# Patient Record
Sex: FEMALE | Race: WHITE | NOT HISPANIC OR LATINO | Employment: OTHER | URBAN - METROPOLITAN AREA
[De-identification: names, ages, dates, MRNs, and addresses within clinical notes are randomized per-mention and may not be internally consistent; named-entity substitution may affect disease eponyms.]

---

## 2023-05-03 LAB
ALBUMIN (G/DL) IN SER/PLAS: 4 G/DL (ref 3.4–5)
ANION GAP IN SER/PLAS: 11 MMOL/L (ref 10–20)
APPEARANCE, URINE: NORMAL
BILIRUBIN, URINE: NEGATIVE
BLOOD, URINE: NEGATIVE
CALCIUM (MG/DL) IN SER/PLAS: 9.5 MG/DL (ref 8.6–10.3)
CARBON DIOXIDE, TOTAL (MMOL/L) IN SER/PLAS: 31 MMOL/L (ref 21–32)
CHLORIDE (MMOL/L) IN SER/PLAS: 101 MMOL/L (ref 98–107)
COLOR, URINE: YELLOW
CREATININE (MG/DL) IN SER/PLAS: 1.16 MG/DL (ref 0.5–1.05)
GFR FEMALE: 49 ML/MIN/1.73M2
GLUCOSE (MG/DL) IN SER/PLAS: 80 MG/DL (ref 74–99)
GLUCOSE, URINE: NEGATIVE MG/DL
KETONES, URINE: NEGATIVE MG/DL
LEUKOCYTE ESTERASE, URINE: NEGATIVE
NITRITE, URINE: NEGATIVE
PH, URINE: 5 (ref 5–8)
PHOSPHATE (MG/DL) IN SER/PLAS: 3.4 MG/DL (ref 2.5–4.9)
POTASSIUM (MMOL/L) IN SER/PLAS: 4.2 MMOL/L (ref 3.5–5.3)
PROTEIN, URINE: NEGATIVE MG/DL
SODIUM (MMOL/L) IN SER/PLAS: 139 MMOL/L (ref 136–145)
SPECIFIC GRAVITY, URINE: 1.02 (ref 1–1.03)
UREA NITROGEN (MG/DL) IN SER/PLAS: 15 MG/DL (ref 6–23)
UROBILINOGEN, URINE: <2 MG/DL (ref 0–1.9)

## 2023-05-04 LAB
ALBUMIN (MG/L) IN URINE: 69.2 MG/L
ALBUMIN/CREATININE (UG/MG) IN URINE: 32.6 UG/MG CRT (ref 0–30)
CREATININE (MG/DL) IN URINE: 212 MG/DL (ref 20–320)

## 2023-06-09 LAB
CHOLESTEROL (MG/DL) IN SER/PLAS: 242 MG/DL (ref 0–199)
CHOLESTEROL IN HDL (MG/DL) IN SER/PLAS: 63.5 MG/DL
CHOLESTEROL/HDL RATIO: 3.8
LDL: 157 MG/DL (ref 0–99)
TRIGLYCERIDE (MG/DL) IN SER/PLAS: 110 MG/DL (ref 0–149)
VLDL: 22 MG/DL (ref 0–40)

## 2023-09-01 LAB
ALBUMIN (G/DL) IN SER/PLAS: 4.4 G/DL (ref 3.4–5)
ALBUMIN (MG/L) IN URINE: 511.7 MG/L
ALBUMIN/CREATININE (UG/MG) IN URINE: 336.6 UG/MG CRT (ref 0–30)
ANION GAP IN SER/PLAS: 12 MMOL/L (ref 10–20)
CALCIDIOL (25 OH VITAMIN D3) (NG/ML) IN SER/PLAS: 57 NG/ML
CALCIUM (MG/DL) IN SER/PLAS: 10.3 MG/DL (ref 8.6–10.6)
CARBON DIOXIDE, TOTAL (MMOL/L) IN SER/PLAS: 30 MMOL/L (ref 21–32)
CHLORIDE (MMOL/L) IN SER/PLAS: 103 MMOL/L (ref 98–107)
CREATININE (MG/DL) IN SER/PLAS: 1.15 MG/DL (ref 0.5–1.05)
CREATININE (MG/DL) IN URINE: 152 MG/DL (ref 20–320)
GFR FEMALE: 50 ML/MIN/1.73M2
GLUCOSE (MG/DL) IN SER/PLAS: 86 MG/DL (ref 74–99)
PHOSPHATE (MG/DL) IN SER/PLAS: 4.3 MG/DL (ref 2.5–4.9)
POTASSIUM (MMOL/L) IN SER/PLAS: 4.6 MMOL/L (ref 3.5–5.3)
SODIUM (MMOL/L) IN SER/PLAS: 140 MMOL/L (ref 136–145)
UREA NITROGEN (MG/DL) IN SER/PLAS: 13 MG/DL (ref 6–23)

## 2023-09-02 LAB
APPEARANCE, URINE: NORMAL
ASCORBIC ACID: NORMAL MG/DL
BILIRUBIN, URINE: NORMAL
BLOOD, URINE: NORMAL
COLOR, URINE: NORMAL
GLUCOSE, URINE: NORMAL
KETONES, URINE: NORMAL
LEUKOCYTE ESTERASE, URINE: NORMAL
NITRITE, URINE: NORMAL
PH, URINE: NORMAL
PROTEIN, URINE: NORMAL
SPECIFIC GRAVITY, URINE: NORMAL
UROBILINOGEN, URINE: NORMAL

## 2023-09-11 LAB
ALBUMIN (MG/L) IN URINE: 17.8 MG/L
ALBUMIN/CREATININE (UG/MG) IN URINE: 18.6 UG/MG CRT (ref 0–30)
CREATININE (MG/DL) IN URINE: 95.7 MG/DL (ref 20–320)
CREATININE (MG/DL) IN URINE: 95.7 MG/DL (ref 20–320)
PROTEIN (MG/DL) IN URINE: 12 MG/DL (ref 5–24)
PROTEIN/CREATININE (MG/MG) IN URINE: 0.13 MG/MG CREAT (ref 0–0.17)

## 2023-09-18 LAB
ALBUMIN (G/DL) IN SER/PLAS: 4.3 G/DL (ref 3.4–5)
ANION GAP IN SER/PLAS: 9 MMOL/L (ref 10–20)
CALCIUM (MG/DL) IN SER/PLAS: 9.8 MG/DL (ref 8.6–10.6)
CARBON DIOXIDE, TOTAL (MMOL/L) IN SER/PLAS: 31 MMOL/L (ref 21–32)
CHLORIDE (MMOL/L) IN SER/PLAS: 103 MMOL/L (ref 98–107)
CREATININE (MG/DL) IN SER/PLAS: 1.14 MG/DL (ref 0.5–1.05)
GFR FEMALE: 50 ML/MIN/1.73M2
GLUCOSE (MG/DL) IN SER/PLAS: 72 MG/DL (ref 74–99)
PHOSPHATE (MG/DL) IN SER/PLAS: 4 MG/DL (ref 2.5–4.9)
POTASSIUM (MMOL/L) IN SER/PLAS: 4.8 MMOL/L (ref 3.5–5.3)
SODIUM (MMOL/L) IN SER/PLAS: 138 MMOL/L (ref 136–145)
UREA NITROGEN (MG/DL) IN SER/PLAS: 14 MG/DL (ref 6–23)

## 2023-10-05 DIAGNOSIS — F33.9 MAJOR DEPRESSIVE DISORDER, RECURRENT EPISODE, UNSPECIFIED (CMS-HCC): Primary | ICD-10-CM

## 2023-10-05 RX ORDER — ESCITALOPRAM OXALATE 10 MG/1
10 TABLET ORAL DAILY
COMMUNITY
Start: 2022-08-15 | End: 2023-10-05 | Stop reason: SDUPTHER

## 2023-10-06 RX ORDER — ESCITALOPRAM OXALATE 10 MG/1
10 TABLET ORAL DAILY
Qty: 90 TABLET | Refills: 2 | Status: SHIPPED | OUTPATIENT
Start: 2023-10-06 | End: 2024-05-15

## 2023-11-29 ENCOUNTER — LAB (OUTPATIENT)
Dept: LAB | Facility: LAB | Age: 73
End: 2023-11-29
Payer: MEDICARE

## 2023-11-29 DIAGNOSIS — I10 ESSENTIAL (PRIMARY) HYPERTENSION: ICD-10-CM

## 2023-11-29 DIAGNOSIS — E55.9 VITAMIN D DEFICIENCY, UNSPECIFIED: ICD-10-CM

## 2023-11-29 DIAGNOSIS — N18.30 CHRONIC KIDNEY DISEASE, STAGE 3 UNSPECIFIED (MULTI): Primary | ICD-10-CM

## 2023-11-29 LAB
25(OH)D3 SERPL-MCNC: 55 NG/ML (ref 30–100)
ALBUMIN SERPL BCP-MCNC: 4.2 G/DL (ref 3.4–5)
ANION GAP SERPL CALC-SCNC: 14 MMOL/L (ref 10–20)
BUN SERPL-MCNC: 17 MG/DL (ref 6–23)
CALCIUM SERPL-MCNC: 9.6 MG/DL (ref 8.6–10.6)
CHLORIDE SERPL-SCNC: 105 MMOL/L (ref 98–107)
CHOLEST SERPL-MCNC: 247 MG/DL (ref 0–199)
CHOLESTEROL/HDL RATIO: 4.2
CO2 SERPL-SCNC: 27 MMOL/L (ref 21–32)
CREAT SERPL-MCNC: 1.24 MG/DL (ref 0.5–1.05)
CREAT UR-MCNC: 91.8 MG/DL (ref 20–320)
ERYTHROCYTE [DISTWIDTH] IN BLOOD BY AUTOMATED COUNT: 12.6 % (ref 11.5–14.5)
GFR SERPL CREATININE-BSD FRML MDRD: 45 ML/MIN/1.73M*2
GLUCOSE SERPL-MCNC: 90 MG/DL (ref 74–99)
HCT VFR BLD AUTO: 41.7 % (ref 36–46)
HDLC SERPL-MCNC: 59.1 MG/DL
HGB BLD-MCNC: 13 G/DL (ref 12–16)
LDLC SERPL CALC-MCNC: 166 MG/DL
MCH RBC QN AUTO: 30 PG (ref 26–34)
MCHC RBC AUTO-ENTMCNC: 31.2 G/DL (ref 32–36)
MCV RBC AUTO: 96 FL (ref 80–100)
MICROALBUMIN UR-MCNC: <7 MG/L
MICROALBUMIN/CREAT UR: NORMAL MG/G{CREAT}
MUCOUS THREADS #/AREA URNS AUTO: NORMAL /LPF
NON HDL CHOLESTEROL: 188 MG/DL (ref 0–149)
NRBC BLD-RTO: 0 /100 WBCS (ref 0–0)
PHOSPHATE SERPL-MCNC: 3.9 MG/DL (ref 2.5–4.9)
PLATELET # BLD AUTO: 300 X10*3/UL (ref 150–450)
POTASSIUM SERPL-SCNC: 4.5 MMOL/L (ref 3.5–5.3)
PTH-INTACT SERPL-MCNC: 48.4 PG/ML (ref 18.5–88)
RBC # BLD AUTO: 4.33 X10*6/UL (ref 4–5.2)
RBC #/AREA URNS AUTO: NORMAL /HPF
SODIUM SERPL-SCNC: 141 MMOL/L (ref 136–145)
TRIGL SERPL-MCNC: 112 MG/DL (ref 0–149)
VLDL: 22 MG/DL (ref 0–40)
WBC # BLD AUTO: 6.1 X10*3/UL (ref 4.4–11.3)
WBC #/AREA URNS AUTO: NORMAL /HPF

## 2023-11-29 PROCEDURE — 80069 RENAL FUNCTION PANEL: CPT

## 2023-11-29 PROCEDURE — 82306 VITAMIN D 25 HYDROXY: CPT

## 2023-11-29 PROCEDURE — 82043 UR ALBUMIN QUANTITATIVE: CPT

## 2023-11-29 PROCEDURE — 80061 LIPID PANEL: CPT

## 2023-11-29 PROCEDURE — 83970 ASSAY OF PARATHORMONE: CPT

## 2023-11-29 PROCEDURE — 85027 COMPLETE CBC AUTOMATED: CPT

## 2023-11-29 PROCEDURE — 82570 ASSAY OF URINE CREATININE: CPT

## 2023-11-29 PROCEDURE — 81001 URINALYSIS AUTO W/SCOPE: CPT

## 2023-11-29 PROCEDURE — 36415 COLL VENOUS BLD VENIPUNCTURE: CPT

## 2023-12-07 DIAGNOSIS — I10 ESSENTIAL (PRIMARY) HYPERTENSION: ICD-10-CM

## 2023-12-11 ENCOUNTER — TELEPHONE (OUTPATIENT)
Dept: PRIMARY CARE | Facility: CLINIC | Age: 73
End: 2023-12-11
Payer: MEDICARE

## 2023-12-11 RX ORDER — LOSARTAN POTASSIUM 25 MG/1
25 TABLET ORAL DAILY
Qty: 90 TABLET | Refills: 0 | Status: SHIPPED | OUTPATIENT
Start: 2023-12-11 | End: 2024-05-01 | Stop reason: ALTCHOICE

## 2023-12-15 ENCOUNTER — ANCILLARY PROCEDURE (OUTPATIENT)
Dept: RADIOLOGY | Facility: CLINIC | Age: 73
End: 2023-12-15
Payer: MEDICARE

## 2023-12-15 DIAGNOSIS — Z78.0 ASYMPTOMATIC MENOPAUSAL STATE: ICD-10-CM

## 2023-12-15 PROCEDURE — 77080 DXA BONE DENSITY AXIAL: CPT | Performed by: RADIOLOGY

## 2023-12-15 PROCEDURE — 77080 DXA BONE DENSITY AXIAL: CPT

## 2023-12-19 DIAGNOSIS — M81.0 AGE-RELATED OSTEOPOROSIS WITHOUT CURRENT PATHOLOGICAL FRACTURE: Primary | ICD-10-CM

## 2023-12-19 RX ORDER — ALENDRONATE SODIUM 70 MG/1
70 TABLET ORAL
Qty: 4 TABLET | Refills: 11 | Status: SHIPPED | OUTPATIENT
Start: 2023-12-19 | End: 2024-12-18

## 2024-02-13 ENCOUNTER — OFFICE VISIT (OUTPATIENT)
Dept: PRIMARY CARE | Facility: CLINIC | Age: 74
End: 2024-02-13
Payer: MEDICARE

## 2024-02-13 VITALS
BODY MASS INDEX: 23.6 KG/M2 | DIASTOLIC BLOOD PRESSURE: 78 MMHG | OXYGEN SATURATION: 96 % | SYSTOLIC BLOOD PRESSURE: 122 MMHG | WEIGHT: 125 LBS | HEIGHT: 61 IN | HEART RATE: 77 BPM | RESPIRATION RATE: 16 BRPM

## 2024-02-13 DIAGNOSIS — N18.2 STAGE 2 CHRONIC KIDNEY DISEASE: ICD-10-CM

## 2024-02-13 DIAGNOSIS — F41.8 DEPRESSION WITH ANXIETY: Primary | ICD-10-CM

## 2024-02-13 DIAGNOSIS — I10 ESSENTIAL HYPERTENSION: ICD-10-CM

## 2024-02-13 PROCEDURE — 1170F FXNL STATUS ASSESSED: CPT | Performed by: INTERNAL MEDICINE

## 2024-02-13 PROCEDURE — 1159F MED LIST DOCD IN RCRD: CPT | Performed by: INTERNAL MEDICINE

## 2024-02-13 PROCEDURE — 99214 OFFICE O/P EST MOD 30 MIN: CPT | Performed by: INTERNAL MEDICINE

## 2024-02-13 PROCEDURE — 1126F AMNT PAIN NOTED NONE PRSNT: CPT | Performed by: INTERNAL MEDICINE

## 2024-02-13 PROCEDURE — 1036F TOBACCO NON-USER: CPT | Performed by: INTERNAL MEDICINE

## 2024-02-13 PROCEDURE — 3078F DIAST BP <80 MM HG: CPT | Performed by: INTERNAL MEDICINE

## 2024-02-13 PROCEDURE — 3074F SYST BP LT 130 MM HG: CPT | Performed by: INTERNAL MEDICINE

## 2024-02-13 ASSESSMENT — ACTIVITIES OF DAILY LIVING (ADL)
TOILETING: INDEPENDENT
GROOMING: INDEPENDENT
PATIENT'S MEMORY ADEQUATE TO SAFELY COMPLETE DAILY ACTIVITIES?: YES
JUDGMENT_ADEQUATE_SAFELY_COMPLETE_DAILY_ACTIVITIES: YES
BATHING: INDEPENDENT
FEEDING YOURSELF: INDEPENDENT
ADEQUATE_TO_COMPLETE_ADL: YES
HEARING - RIGHT EAR: FUNCTIONAL
DRESSING YOURSELF: INDEPENDENT
WALKS IN HOME: INDEPENDENT

## 2024-02-13 ASSESSMENT — ENCOUNTER SYMPTOMS
DEPRESSION: 0
OCCASIONAL FEELINGS OF UNSTEADINESS: 0
LOSS OF SENSATION IN FEET: 0

## 2024-02-13 ASSESSMENT — PATIENT HEALTH QUESTIONNAIRE - PHQ9
2. FEELING DOWN, DEPRESSED OR HOPELESS: NOT AT ALL
SUM OF ALL RESPONSES TO PHQ9 QUESTIONS 1 AND 2: 0
1. LITTLE INTEREST OR PLEASURE IN DOING THINGS: NOT AT ALL

## 2024-02-13 NOTE — PATIENT INSTRUCTIONS
It was a pleasure to see you Ms. Olmstead! You are doing well, and your blood pressure is well controlled. You are up to date on your mammogram, and now that you are older, we no longer recommend a colonoscopy for screening. Please make sure you get your updated Shingrix vaccine at your local pharmacy. Have a great time in [place] and the [facility], and make sure you get any necessary vaccines with travel clinic. With regards to good weight bearing exercises, we recommend some push ups to help you.

## 2024-02-13 NOTE — PROGRESS NOTES
"Subjective      HPI    Patient Care Team:  Saravanan Logan MD as PCP - General  Saravanan Logan MD as PCP - [facility] Attributed Provider     Maribel Olmstead is an 73 y.o. female w/ HTN, CKD, depression, here for follow up, last seen in September during wellness visit. She is going to [place] and the [facility] and has a travel clinic appointment set up. She would like to learn some weight bearing exercises. Otherwise, no concerns today.     HTN: On Amlodipine, was switched to Losartan last visit, but switched back to amlodipine as Losartan was making her dizzy. Checks blood pressures at home, runs 120s-130s systolics.     CKD: Last seen by Dr. Juarez in 11/23, follows a renal diet     Depression: On Wellbutrin and Lexapro, mood is \"fine.\" Has an SAD lamp. No therapy. No SI or HI.     Health Maintenance: Last mammogram 8/2023, DEXA scan done in 12/23 with osteopenia, currently on vitamin d/Ca supplementation. UTD on COVID, flu, RSV,  needs updated Shingrix vaccine.   No fevers no chills, no weight changes.    No URI symptoms    No cough no shortness of breath    No chest pain no palpitations    Appetite intact, no abdominal pain.    No new or unusual headaches.    Vitals and exam:Blood pressure 122/78, pulse 77, resp. rate 16, height 1.549 m (5' 1\"), weight 56.7 kg (125 lb), SpO2 96 %.  Calm coherent and appropriate    Neck is supple and none tender    Breathing comfortably, clear to auscultation bilaterally    Regular rate rhythm, no murmur gallops or rubs.  Good distal pulses.  No edema.  No JVD.    Abdomen soft and nontender, normal bowel sounds.    Extremities warm well perfused with no clubbing or cyanosis    Cognition intact.          Assessment/Plan   Problem List Items Addressed This Visit    None    Pleasant 74 yo female w/ HTN, CKD, depression, here for follow up. She is doing well, no abnormalities on physical exam. UTD on screenings, BP at goal today. Encouraged to get Shingrix at local pharmacy and reviewed " weight bearing exercises.     #HTN  - At goal   - Continue with Amlodipine and Losartan    #CKD  - Last Cr 1.24  - Following with renal, last seen in 11/2023    #Depression  - Stable, continue with Lexapro and Wellbutrin     #Health maintenance  - Now that she is 73, will hold off on further colonoscopies  - Last mammogram 8/23  - Last DEXA scan 12/23  - Reviewed weight bearing exercises  - Pt to go to travel clinic prior to trip to [place] and [place]  [ ]  Follow up in six months for annual medicare wellness visit    Pt seen and discussed with Dr. Logan.    Norma Wilde  Med Peds PGY3       Attending note:    I reviewed resident's note including history, exam, assessment and plan.  I personally obtained key parts of history and examined the patient,  I agree with resident's assessment and plan as stated above.

## 2024-02-27 ENCOUNTER — CLINICAL SUPPORT (OUTPATIENT)
Dept: INFECTIOUS DISEASES | Facility: CLINIC | Age: 74
End: 2024-02-27
Payer: MEDICARE

## 2024-02-27 DIAGNOSIS — Z23 ENCOUNTER FOR VACCINATION: Primary | ICD-10-CM

## 2024-02-27 DIAGNOSIS — Z71.84 COUNSELING FOR TRAVEL: ICD-10-CM

## 2024-02-27 PROCEDURE — 99202U03 TRAVEL CONSULT (U03): Performed by: INTERNAL MEDICINE

## 2024-02-27 PROCEDURE — 90690 TYPHOID VACCINE ORAL: CPT | Performed by: INTERNAL MEDICINE

## 2024-02-27 RX ORDER — TRAZODONE HYDROCHLORIDE 50 MG/1
50 TABLET ORAL NIGHTLY
COMMUNITY
Start: 2022-08-15 | End: 2024-05-01 | Stop reason: DRUGHIGH

## 2024-02-27 RX ORDER — AMLODIPINE BESYLATE 2.5 MG/1
2.5 TABLET ORAL
COMMUNITY
Start: 2022-08-22 | End: 2024-05-01 | Stop reason: ALTCHOICE

## 2024-02-27 RX ORDER — NIRMATRELVIR AND RITONAVIR 150-100 MG
2 KIT ORAL 2 TIMES DAILY
Qty: 20 TABLET | Refills: 0 | Status: SHIPPED | OUTPATIENT
Start: 2024-02-27 | End: 2024-03-03

## 2024-02-27 RX ORDER — SCOLOPAMINE TRANSDERMAL SYSTEM 1 MG/1
1 PATCH, EXTENDED RELEASE TRANSDERMAL
Qty: 3 PATCH | Refills: 0 | Status: SHIPPED | OUTPATIENT
Start: 2024-02-27 | End: 2024-03-07

## 2024-02-27 RX ORDER — BUPROPION HYDROCHLORIDE 300 MG/1
300 TABLET ORAL EVERY MORNING
COMMUNITY
End: 2024-04-24

## 2024-02-27 RX ORDER — CIPROFLOXACIN 500 MG/1
500 TABLET ORAL 2 TIMES DAILY
Qty: 6 TABLET | Refills: 0 | Status: SHIPPED | OUTPATIENT
Start: 2024-02-27 | End: 2024-03-01

## 2024-02-27 RX ORDER — ACETAZOLAMIDE 250 MG/1
250 TABLET ORAL 2 TIMES DAILY
Qty: 20 TABLET | Refills: 0 | Status: SHIPPED | OUTPATIENT
Start: 2024-02-27 | End: 2024-05-01 | Stop reason: ALTCHOICE

## 2024-02-27 RX ORDER — ATOVAQUONE AND PROGUANIL HYDROCHLORIDE 250; 100 MG/1; MG/1
1 TABLET, FILM COATED ORAL DAILY
Qty: 12 TABLET | Refills: 0 | Status: SHIPPED | OUTPATIENT
Start: 2024-02-27 | End: 2024-03-10

## 2024-02-27 NOTE — PROGRESS NOTES
Going to [place] and [place] for 22 days     PLAN  --YF (gave card, were immunized  years ago, accepts any liabilities)  --Oral typhoid  --Malarone (renal dose)  --Cipro prn  --Scopolamine patch  --Acetazolamide  --Paxlovid prn (renal dose)  --Discussed other standard travel precautions

## 2024-03-13 ENCOUNTER — TELEPHONE (OUTPATIENT)
Dept: PRIMARY CARE | Facility: CLINIC | Age: 74
End: 2024-03-13

## 2024-03-13 RX ORDER — TRAZODONE HYDROCHLORIDE 50 MG/1
50 TABLET ORAL NIGHTLY
Qty: 30 TABLET | Refills: 0 | Status: CANCELLED | OUTPATIENT
Start: 2024-03-13

## 2024-04-23 DIAGNOSIS — F41.8 DEPRESSION WITH ANXIETY: ICD-10-CM

## 2024-04-24 RX ORDER — BUPROPION HYDROCHLORIDE 300 MG/1
TABLET ORAL
Qty: 90 TABLET | Refills: 0 | Status: SHIPPED | OUTPATIENT
Start: 2024-04-24

## 2024-05-01 ENCOUNTER — OFFICE VISIT (OUTPATIENT)
Dept: PRIMARY CARE | Facility: CLINIC | Age: 74
End: 2024-05-01
Payer: MEDICARE

## 2024-05-01 VITALS
SYSTOLIC BLOOD PRESSURE: 160 MMHG | OXYGEN SATURATION: 98 % | DIASTOLIC BLOOD PRESSURE: 80 MMHG | WEIGHT: 128 LBS | HEART RATE: 67 BPM | BODY MASS INDEX: 24.19 KG/M2

## 2024-05-01 DIAGNOSIS — L98.9 SKIN LESION: Primary | ICD-10-CM

## 2024-05-01 DIAGNOSIS — I10 PRIMARY HYPERTENSION: ICD-10-CM

## 2024-05-01 DIAGNOSIS — E78.5 DYSLIPIDEMIA: ICD-10-CM

## 2024-05-01 DIAGNOSIS — Z12.31 ENCOUNTER FOR SCREENING MAMMOGRAM FOR MALIGNANT NEOPLASM OF BREAST: ICD-10-CM

## 2024-05-01 DIAGNOSIS — N18.31 STAGE 3A CHRONIC KIDNEY DISEASE (MULTI): ICD-10-CM

## 2024-05-01 PROCEDURE — 3079F DIAST BP 80-89 MM HG: CPT | Performed by: STUDENT IN AN ORGANIZED HEALTH CARE EDUCATION/TRAINING PROGRAM

## 2024-05-01 PROCEDURE — 3077F SYST BP >= 140 MM HG: CPT | Performed by: STUDENT IN AN ORGANIZED HEALTH CARE EDUCATION/TRAINING PROGRAM

## 2024-05-01 PROCEDURE — G2211 COMPLEX E/M VISIT ADD ON: HCPCS | Performed by: STUDENT IN AN ORGANIZED HEALTH CARE EDUCATION/TRAINING PROGRAM

## 2024-05-01 PROCEDURE — 99204 OFFICE O/P NEW MOD 45 MIN: CPT | Performed by: STUDENT IN AN ORGANIZED HEALTH CARE EDUCATION/TRAINING PROGRAM

## 2024-05-01 PROCEDURE — 1159F MED LIST DOCD IN RCRD: CPT | Performed by: STUDENT IN AN ORGANIZED HEALTH CARE EDUCATION/TRAINING PROGRAM

## 2024-05-01 RX ORDER — TRAZODONE HYDROCHLORIDE 50 MG/1
25 TABLET ORAL NIGHTLY
Qty: 45 TABLET | Refills: 1 | Status: SHIPPED | OUTPATIENT
Start: 2024-05-01

## 2024-05-01 RX ORDER — AMLODIPINE BESYLATE 5 MG/1
5 TABLET ORAL DAILY
Qty: 90 TABLET | Refills: 1 | Status: SHIPPED | OUTPATIENT
Start: 2024-05-01 | End: 2024-10-28

## 2024-05-01 NOTE — PROGRESS NOTES
Maribel Olmstead is a 74 y.o. female seen in Clinic at [facility] by Dr. Donn Beard on 05/01/24 for routine care, as well as for management of the following chronic medical conditions: Osteoporosis (on Fosamax), HTN, CKD III, DLD (not currently on statin), Depression/Anxiety (well controlled), Insomnia. Patient presents today to establish care.     #Osteoporosis  - last DEXA 12/2023   - started on Fosamax 12/2023  [  ] repeat DEXA due 12/2025  - normal Vitamin D per labs 11/2023     #HTN  - elevated in office today initially and on manual repeat  - Amlodipine 2.5mg daily   [  ] increase to 5mg daily   [  ] home BP monitoring  [  ] labs today   [  ] close follow up in 6 weeks     #Depression/Anxiety   #Insomnia   - Wellbutrin 300mg daily, Lexapro 10mg daily, Trazodone 25mg at bedtime   - well controlled at present     #CKD III  - history of kidney donation remotely   - concurrent HTN as above  - renal function with Cr around 1.2 since at least 2019  - renal US reassuring 2022  - previously seen by nephrology, Dr. Juarez  [  ] updated labs today including Cystatin C   [  ] BP monitoring and med adjustments as above  - prior ARB (Losartan) intolerance per patient   - reassuring PTH, Urine Albumin 11/2023    #DLD   - worsening over last few years  - strong family history of CVA  [  ] updated labs  [  ] CAC scoring   [  ] patient would consider statin    Past Medical History: as above   No past medical history on file.  Subspecialty Medical Care: Nephrology (Larry)    Past Surgical History:   - Nephrectomy (family friend)  - Tonsillectomy   No past surgical history on file.    Medications:  Current Outpatient Medications:     alendronate (Fosamax) 70 mg tablet, Take 1 tablet (70 mg) by mouth every 7 days. Take in the morning with a full glass of water, on an empty stomach, and do not take anything else by mouth or lie down for the next 30 min., Disp: 4 tablet, Rfl: 11    amLODIPine (Norvasc) 5 mg tablet, Take 1 tablet (5  "mg) by mouth once daily., Disp: 90 tablet, Rfl: 1    buPROPion XL (Wellbutrin XL) 300 mg 24 hr tablet, Take one daily, Disp: 90 tablet, Rfl: 0    escitalopram (Lexapro) 10 mg tablet, Take 1 tablet (10 mg) by mouth once daily., Disp: 90 tablet, Rfl: 2    traZODone (Desyrel) 50 mg tablet, Take 0.5 tablets (25 mg) by mouth once daily at bedtime., Disp: 45 tablet, Rfl: 1  Pharmacy: [facility] (in [place] for local [facility])     Allergies:   Allergies   Allergen Reactions    Lidocaine Itching     Eye swelling after cataract surgery    Morphine Itching     Immunizations:   - Flu annually   - COVID recommend staying UTD     Family History:   - CAD in father, CVA in father; passed away at 92, however  No family history on file.    Social History:   Home/Living Situation/Falls/Safety Assessment: from [place]; lives at home at with , feels safe at home; daughter, 5.5 year old grandson   Education/Employment/Work/Vocational: Journalism; retired   Activities: 2 miles per day walking; regular exercises, has lapsed since last trip   Drug Use: remote smoker  Diet: stage III CKD, thoughtful regarding diet; uses \"Kidney Diet\" baljeet   Depression/Anxiety: known diagnosis, stable on current regimen   Sexuality/Contraception/Menstrual History:   Sleep: trazodone for insomnia     Patient Information:  Health Insurance: Medicare   Transportation: [employer]   Healthcare POA/Guardian:    Contact Information: correct in EMR     Visit Vitals  /80   Pulse 67   Wt 58.1 kg (128 lb)   SpO2 98%   BMI 24.19 kg/m²   Smoking Status Never   BSA 1.58 m²      PHYSICAL EXAM:   General: well appearing  female, NAD, pleasant and engaged in encounter    HEENT: NCAT, MMM  CV: RRR, no m/r/g  PULM: CTAB, non-labored respirations   ABD: soft, NT, ND, + bowel sounds   : no suprapubic or CVA tenderness   EXT: WWP, no significant edema   SKIN: no rashes noted   NEURO: A&Ox4, symmetric " facies, no gross motor or sensory deficits, normal gait  PSYCH: pleasant mood, appropriate affect     Assessment/Plan    Maribel Olmstead is a 74 y.o. female seen in Clinic at [facility] by Dr. Donn Beard on 05/01/24 for routine care, as well as for management of the following chronic medical conditions: Osteoporosis (on Fosamax), HTN, CKD III, DLD (not currently on statin), Depression/Anxiety (well controlled), Insomnia. Patient presents today to establish care.     #Osteoporosis  - last DEXA 12/2023   - started on Fosamax 12/2023  [  ] repeat DEXA due 12/2025  - normal Vitamin D per labs 11/2023     #HTN  - elevated in office today initially and on manual repeat  - Amlodipine 2.5mg daily   [  ] increase to 5mg daily   [  ] home BP monitoring  [  ] labs today   [  ] close follow up in 6 weeks     #Depression/Anxiety   #Insomnia   - Wellbutrin 300mg daily, Lexapro 10mg daily, Trazodone 25mg at bedtime   - well controlled at present     #CKD III  - history of kidney donation remotely   - concurrent HTN as above  - renal function with Cr around 1.2 since at least 2019  - renal US reassuring 2022  - previously seen by nephrology, Dr. Juarez  [  ] updated labs today including Cystatin C   [  ] BP monitoring and med adjustments as above  - prior ARB (Losartan) intolerance per patient   - reassuring PTH, Urine Albumin 11/2023    #DLD   - worsening over last few years  - strong family history of CVA  [  ] updated labs  [  ] CAC scoring   [  ] patient would consider statin    #Health Maintenance    Cancer Screening  - Mammography: due 08/2024 (ordered today)   - Colorectal Cancer Screening: last 10/2019, discussed 5 year repeat (unclear exact indications; appears there was some concern for microscopic colitis at that time but did not meet criteria; patient symptomatically feeling well at this time) --> discussed with GI, would be due at 10 year jatin in 2029 if decision to pursue ongoing screening   - Lung Cancer Screening:  non-smoker     Laboratory Screening  - Lipid Screen: labs today   - ASCVD Score: labs today   - A1C, glucose screen: CMP today   - STI, HIV, Hep B screen: discuss at CPE   - Hep C screen: discuss at CPE    Imaging Screening:  - Osteoporosis/DEXA screenin2023; 2025     Immunizations:   - Influenza: annually recommended  - COVID: recommend staying UTD with boosters  - RSV: completed 2023  - Tdap: due   - Prevnar, Pneumovax: at least one dose ; consider PCV-20 in   - Shingrix: recommended     Other Screening  - Health Literacy Assessment: excellent   - Depression screen: known diagnosis, doing well on current regimen   - Home safety/partner violence screen: no concerns   - Hearing/Vision screens: corrective lenses, follows with optho  - Alcohol/tobacco/drug use screen: non-smoker  - Healthcare POA/Advanced Directives:      Referrals: labs, CAC scoring, Dermatology follow up, mammogram, BP med adjustment and home monitoring   Return to clinic in 6 weeks for follow-up of HTN.     Patient Discussion:    Please call back the office with any questions at [phone]. In the case of an emergency, please call [phone] or go to the nearest Emergency Department.      Donn Beard MD  Internal Medicine-Pediatrics  [facility] [address], [address]  P: [phone], F: [phone]

## 2024-05-01 NOTE — PATIENT INSTRUCTIONS
FASTING labs--encourage water beforehand   Can be done at any  lab facility    Increase Amlodipine to 5mg daily   New script sent  Can use remaining supply of what you have by taking TWO tablets together once daily     CT Calcium Scoring of the heart  Call [phone] to schedule  Let them know you preference [facility]    Home blood pressure monitoring    Mammogram ordered for August 2024    Follow up with me in 6 weeks!    Best,  Dr. TERRELL

## 2024-05-06 ENCOUNTER — HOSPITAL ENCOUNTER (OUTPATIENT)
Dept: RADIOLOGY | Facility: CLINIC | Age: 74
Discharge: HOME | End: 2024-05-06
Payer: MEDICARE

## 2024-05-06 DIAGNOSIS — E78.5 DYSLIPIDEMIA: ICD-10-CM

## 2024-05-06 PROCEDURE — 75571 CT HRT W/O DYE W/CA TEST: CPT

## 2024-05-08 ENCOUNTER — TELEPHONE (OUTPATIENT)
Dept: PRIMARY CARE | Facility: CLINIC | Age: 74
End: 2024-05-08

## 2024-05-08 DIAGNOSIS — E78.5 DYSLIPIDEMIA: Primary | ICD-10-CM

## 2024-05-08 RX ORDER — ROSUVASTATIN CALCIUM 10 MG/1
10 TABLET, COATED ORAL DAILY
Qty: 100 TABLET | Refills: 3 | Status: SHIPPED | OUTPATIENT
Start: 2024-05-08 | End: 2025-06-12

## 2024-05-08 NOTE — PROGRESS NOTES
New start Rosuva 10 daily based on CAC scoring and lipid panel  Repeat labs in 2 months    Donn Beard MD

## 2024-05-13 DIAGNOSIS — F33.9 MAJOR DEPRESSIVE DISORDER, RECURRENT EPISODE, UNSPECIFIED (CMS-HCC): ICD-10-CM

## 2024-05-15 RX ORDER — ESCITALOPRAM OXALATE 10 MG/1
10 TABLET ORAL DAILY
Qty: 90 TABLET | Refills: 3 | Status: SHIPPED | OUTPATIENT
Start: 2024-05-15

## 2024-05-17 ENCOUNTER — LAB (OUTPATIENT)
Dept: LAB | Facility: LAB | Age: 74
End: 2024-05-17
Payer: MEDICARE

## 2024-05-17 DIAGNOSIS — E78.5 DYSLIPIDEMIA: ICD-10-CM

## 2024-05-17 DIAGNOSIS — N18.31 STAGE 3A CHRONIC KIDNEY DISEASE (MULTI): ICD-10-CM

## 2024-05-17 LAB
ALBUMIN SERPL BCP-MCNC: 3.8 G/DL (ref 3.4–5)
ALP SERPL-CCNC: 59 U/L (ref 33–136)
ALT SERPL W P-5'-P-CCNC: 22 U/L (ref 7–45)
ANION GAP SERPL CALC-SCNC: 12 MMOL/L (ref 10–20)
AST SERPL W P-5'-P-CCNC: 19 U/L (ref 9–39)
BILIRUB SERPL-MCNC: 0.5 MG/DL (ref 0–1.2)
BUN SERPL-MCNC: 18 MG/DL (ref 6–23)
CALCIUM SERPL-MCNC: 9.5 MG/DL (ref 8.6–10.6)
CHLORIDE SERPL-SCNC: 105 MMOL/L (ref 98–107)
CHOLEST SERPL-MCNC: 250 MG/DL (ref 0–199)
CHOLESTEROL/HDL RATIO: 4.1
CO2 SERPL-SCNC: 31 MMOL/L (ref 21–32)
CREAT SERPL-MCNC: 1.11 MG/DL (ref 0.5–1.05)
EGFRCR SERPLBLD CKD-EPI 2021: 52 ML/MIN/1.73M*2
GLUCOSE SERPL-MCNC: 81 MG/DL (ref 74–99)
HDLC SERPL-MCNC: 61 MG/DL
LDLC SERPL CALC-MCNC: 167 MG/DL
NON HDL CHOLESTEROL: 189 MG/DL (ref 0–149)
POTASSIUM SERPL-SCNC: 4.8 MMOL/L (ref 3.5–5.3)
PROT SERPL-MCNC: 5.8 G/DL (ref 6.4–8.2)
SODIUM SERPL-SCNC: 143 MMOL/L (ref 136–145)
TRIGL SERPL-MCNC: 110 MG/DL (ref 0–149)
VLDL: 22 MG/DL (ref 0–40)

## 2024-05-17 PROCEDURE — 82610 CYSTATIN C: CPT

## 2024-05-17 PROCEDURE — 80061 LIPID PANEL: CPT

## 2024-05-17 PROCEDURE — 36415 COLL VENOUS BLD VENIPUNCTURE: CPT

## 2024-05-17 PROCEDURE — 80053 COMPREHEN METABOLIC PANEL: CPT

## 2024-05-20 LAB
CYSTATIN C SERPL-MCNC: 1.3 MG/L (ref 0.5–1.2)
GFR/BSA.PRED SERPLBLD CYS-BASED-ARV: 48 ML/MIN/BSA

## 2024-06-11 ENCOUNTER — APPOINTMENT (OUTPATIENT)
Dept: PRIMARY CARE | Facility: CLINIC | Age: 74
End: 2024-06-11
Payer: MEDICARE

## 2024-06-18 DIAGNOSIS — F41.8 DEPRESSION WITH ANXIETY: ICD-10-CM

## 2024-06-19 ENCOUNTER — APPOINTMENT (OUTPATIENT)
Dept: PRIMARY CARE | Facility: CLINIC | Age: 74
End: 2024-06-19
Payer: MEDICARE

## 2024-06-19 DIAGNOSIS — H10.31 ACUTE BACTERIAL CONJUNCTIVITIS OF RIGHT EYE: ICD-10-CM

## 2024-06-19 DIAGNOSIS — N18.31 STAGE 3A CHRONIC KIDNEY DISEASE (MULTI): ICD-10-CM

## 2024-06-19 DIAGNOSIS — F41.8 DEPRESSION WITH ANXIETY: ICD-10-CM

## 2024-06-19 DIAGNOSIS — Z00.00 MEDICARE ANNUAL WELLNESS VISIT, SUBSEQUENT: Primary | ICD-10-CM

## 2024-06-19 DIAGNOSIS — G31.84 MILD COGNITIVE IMPAIRMENT: ICD-10-CM

## 2024-06-19 DIAGNOSIS — M81.0 OSTEOPOROSIS, UNSPECIFIED OSTEOPOROSIS TYPE, UNSPECIFIED PATHOLOGICAL FRACTURE PRESENCE: ICD-10-CM

## 2024-06-19 DIAGNOSIS — I10 ESSENTIAL HYPERTENSION: ICD-10-CM

## 2024-06-19 DIAGNOSIS — E78.5 DYSLIPIDEMIA: ICD-10-CM

## 2024-06-19 PROCEDURE — 1123F ACP DISCUSS/DSCN MKR DOCD: CPT | Performed by: STUDENT IN AN ORGANIZED HEALTH CARE EDUCATION/TRAINING PROGRAM

## 2024-06-19 PROCEDURE — 1170F FXNL STATUS ASSESSED: CPT | Performed by: STUDENT IN AN ORGANIZED HEALTH CARE EDUCATION/TRAINING PROGRAM

## 2024-06-19 PROCEDURE — G0439 PPPS, SUBSEQ VISIT: HCPCS | Performed by: STUDENT IN AN ORGANIZED HEALTH CARE EDUCATION/TRAINING PROGRAM

## 2024-06-19 PROCEDURE — 1159F MED LIST DOCD IN RCRD: CPT | Performed by: STUDENT IN AN ORGANIZED HEALTH CARE EDUCATION/TRAINING PROGRAM

## 2024-06-19 PROCEDURE — 99214 OFFICE O/P EST MOD 30 MIN: CPT | Performed by: STUDENT IN AN ORGANIZED HEALTH CARE EDUCATION/TRAINING PROGRAM

## 2024-06-19 PROCEDURE — G2211 COMPLEX E/M VISIT ADD ON: HCPCS | Performed by: STUDENT IN AN ORGANIZED HEALTH CARE EDUCATION/TRAINING PROGRAM

## 2024-06-19 PROCEDURE — 1160F RVW MEDS BY RX/DR IN RCRD: CPT | Performed by: STUDENT IN AN ORGANIZED HEALTH CARE EDUCATION/TRAINING PROGRAM

## 2024-06-19 PROCEDURE — 3074F SYST BP LT 130 MM HG: CPT | Performed by: STUDENT IN AN ORGANIZED HEALTH CARE EDUCATION/TRAINING PROGRAM

## 2024-06-19 PROCEDURE — 3079F DIAST BP 80-89 MM HG: CPT | Performed by: STUDENT IN AN ORGANIZED HEALTH CARE EDUCATION/TRAINING PROGRAM

## 2024-06-19 RX ORDER — BUPROPION HYDROCHLORIDE 300 MG/1
300 TABLET ORAL DAILY
Qty: 90 TABLET | Refills: 3 | Status: SHIPPED | OUTPATIENT
Start: 2024-06-19

## 2024-06-19 RX ORDER — TOBRAMYCIN 3 MG/ML
SOLUTION/ DROPS OPHTHALMIC
Qty: 5 ML | Refills: 0 | Status: SHIPPED | OUTPATIENT
Start: 2024-06-19

## 2024-06-19 ASSESSMENT — ACTIVITIES OF DAILY LIVING (ADL)
DRESSING: INDEPENDENT
TAKING_MEDICATION: INDEPENDENT
BATHING: INDEPENDENT
MANAGING_FINANCES: INDEPENDENT
GROCERY_SHOPPING: INDEPENDENT
DOING_HOUSEWORK: INDEPENDENT

## 2024-06-19 ASSESSMENT — ENCOUNTER SYMPTOMS
DEPRESSION: 0
OCCASIONAL FEELINGS OF UNSTEADINESS: 0
LOSS OF SENSATION IN FEET: 0

## 2024-06-19 NOTE — PROGRESS NOTES
Maribel Olmstead is a 74 y.o. female seen in Clinic at [facility] by Dr. Donn Beard on 06/19/24 for routine care, as well as for management of the following chronic medical conditions: Osteoporosis (on Fosamax), HTN, CKD III, DLD, Depression/Anxiety (well controlled), Insomnia. Patient presents today for follow up of multiple chronic medical conditions and ongoing cognitive concerns.     ACUTE CONCERNS:   #Cognitive Concerns  - per patient and her family   - MOCA today in office 25/30  [  ] MRI brain without contrast  [  ] screening labs  [  ] neuropsych and neurology referrals   - notes depression/anxiety to be well controlled at present     CHRONIC MEDICAL CONDITIONS:   #Osteoporosis  - last DEXA 12/2023   - started on Fosamax 12/2023  [  ] repeat DEXA due 12/2025  - normal Vitamin D per labs 11/2023     #HTN  - BP readings improved on 5mg daily Amlodipine from 2.5  [  ] CTM; no additional changes today     #Depression/Anxiety   #Insomnia   - Wellbutrin 300mg daily, Lexapro 10mg daily, Trazodone 25mg at bedtime   - well controlled at present     #CKD III  - history of kidney donation remotely   - concurrent HTN as above  - renal function with Cr around 1.2 since at least 2019  - renal US reassuring 2022  - previously seen by nephrology, Dr. Juarez  [x] updated labs today including Cystatin C: eGFR ~50 per cystatin C and Cr measurements   - prior ARB (Losartan) intolerance per patient   - reassuring PTH, Urine Albumin 11/2023    #DLD   - worsening over last few years  - strong family history of CVA  [x] CAC scoring: scoring 17 in 05/2024  [x] patient would consider statin: Rosuvastatin 10mg daily   [  ] repeat labs post statin initiation     Past Medical History: as above   No past medical history on file.  Subspecialty Medical Care: Nephrology (Larry)    Past Surgical History:   - Nephrectomy (family friend)  - Tonsillectomy   No past surgical history on file.    Medications:  Current Outpatient Medications:      "alendronate (Fosamax) 70 mg tablet, Take 1 tablet (70 mg) by mouth every 7 days. Take in the morning with a full glass of water, on an empty stomach, and do not take anything else by mouth or lie down for the next 30 min., Disp: 4 tablet, Rfl: 11    amLODIPine (Norvasc) 5 mg tablet, Take 1 tablet (5 mg) by mouth once daily., Disp: 90 tablet, Rfl: 1    buPROPion XL (Wellbutrin XL) 300 mg 24 hr tablet, TAKE 1 TABLET BY MOUTH DAILY, Disp: 90 tablet, Rfl: 3    escitalopram (Lexapro) 10 mg tablet, TAKE 1 TABLET BY MOUTH ONCE  DAILY, Disp: 90 tablet, Rfl: 3    rosuvastatin (Crestor) 10 mg tablet, Take 1 tablet (10 mg) by mouth once daily., Disp: 100 tablet, Rfl: 3    tobramycin (Tobrex) 0.3 % ophthalmic solution, Instill 1 to 2 drops into affected eye(s) every 4 hours for mild to moderate infections., Disp: 5 mL, Rfl: 0    traZODone (Desyrel) 50 mg tablet, Take 0.5 tablets (25 mg) by mouth once daily at bedtime., Disp: 45 tablet, Rfl: 1  [facility]in [place] [facility])     Allergies:   Allergies   Allergen Reactions    Lidocaine Itching     Eye swelling after cataract surgery    Morphine Itching     Immunizations:   - Flu annually   - COVID recommend staying UTD     Family History:   - CAD in father, CVA in father; passed away at 92, however  No family history on file.    Social History:   Home/Living Situation/Falls/Safety Assessment: from [place]-->[place]; lives at home at with , feels safe at home; daughter, 5.5 year old grandson   Education/Employment/Work/Vocational: Journalism; retired   Activities: 2 miles per day walking; regular exercises, has lapsed since last trip   Drug Use: remote smoker  Diet: stage III CKD, thoughtful regarding diet; uses \"Kidney Diet\" baljeet   Depression/Anxiety: known diagnosis, stable on current regimen   Sexuality/Contraception/Menstrual History:   Sleep: trazodone for insomnia     Patient Information:  Health Insurance: " Medicare   Transportation: Drives   Healthcare POA/Guardian:    Contact Information: correct in EMR     Visit Vitals  /80   Wt 58.8 kg (129 lb 9.6 oz)   BMI 24.49 kg/m²   Smoking Status Never   BSA 1.59 m²      PHYSICAL EXAM:   General: well appearing  female, NAD, pleasant and engaged in encounter    HEENT: NCAT, MMM  CV: RRR, no m/r/g  PULM: CTAB, non-labored respirations   ABD: soft, NT, ND, + bowel sounds   : no suprapubic or CVA tenderness   EXT: WWP, no significant edema   SKIN: no rashes noted   NEURO: A&Ox4, symmetric facies, no gross motor or sensory deficits, normal gait  PSYCH: pleasant mood, appropriate affect     Assessment/Plan    Maribel Olmstead is a 74 y.o. female seen in Clinic at [facility] by Dr. Donn Beard on 06/19/24 for routine care, as well as for management of the following chronic medical conditions: Osteoporosis (on Fosamax), HTN, CKD III, DLD, Depression/Anxiety (well controlled), Insomnia. Patient presents today for follow up of multiple chronic medical conditions and ongoing cognitive concerns.     ACUTE CONCERNS:   #Cognitive Concerns  - per patient and her family   - MOCA today in office 25/30  [  ] MRI brain without contrast  [  ] screening labs  [  ] neuropsych and neurology referrals   - notes depression/anxiety to be well controlled at present     CHRONIC MEDICAL CONDITIONS:   #Osteoporosis  - last DEXA 12/2023   - started on Fosamax 12/2023  [  ] repeat DEXA due 12/2025  - normal Vitamin D per labs 11/2023     #HTN  - BP readings improved on 5mg daily Amlodipine from 2.5  [  ] CTM; no additional changes today     #Depression/Anxiety   #Insomnia   - Wellbutrin 300mg daily, Lexapro 10mg daily, Trazodone 25mg at bedtime   - well controlled at present     #CKD III  - history of kidney donation remotely   - concurrent HTN as above  - renal function with Cr around 1.2 since at least 2019  - renal US reassuring 2022  - previously seen by nephrology, Dr. Juarez  [x]  updated labs today including Cystatin C: eGFR ~50 per cystatin C and Cr measurements   - prior ARB (Losartan) intolerance per patient   - reassuring PTH, Urine Albumin 2023    #DLD   - worsening over last few years  - strong family history of CVA  [x] CAC scoring: scoring 17 in 2024  [x] patient would consider statin: Rosuvastatin 10mg daily   [  ] repeat labs post statin initiation     #Health Maintenance  Last W Visit: due 2024    Cancer Screening  - Mammography: pending in August   - Colorectal Cancer Screening: last 10/2019, discussed 5 year repeat (unclear exact indications; appears there was some concern for microscopic colitis at that time but did not meet criteria; patient symptomatically feeling well at this time) --> discussed with GI, would be due at 10 year jatin in  if decision to pursue ongoing screening, around 10/2029   - Lung Cancer Screening: non-smoker     Laboratory Screening  - Lipid Screen: pending repeat lipid panel post-statin initiation   - ASCVD Score:   - A1C, glucose screen: pending repeat lipid panel post-statin initiation   - STI, HIV, Hep B screen: defer  - Hep C screen: defer    Imaging Screening:  - Osteoporosis/DEXA screenin2023; 2025     Immunizations:   - Influenza: annually recommended  - COVID: recommend staying UTD with boosters  - RSV: completed 2023  - Tdap: due   - Prevnar, Pneumovax: at least one dose ; consider PCV-20 in   - Shingrix: recommended     Other Screening  - Health Literacy Assessment: excellent   - Depression screen: known diagnosis, doing well on current regimen   - Home safety/partner violence screen: no concerns   - Hearing/Vision screens: corrective lenses, follows with optho  - Alcohol/tobacco/drug use screen: non-smoker  - Healthcare POA/Advanced Directives:      Referrals: MOCA today in office ; MRI brain, Neuropsych and Neurology referrals, pending mammogram, repeat labs in 1 month    RTC in 3-4 months  for follow up.     Patient Discussion:    Please call back the office with any questions at [phone]. In the case of an emergency, please call 911 or go to the nearest Emergency Department.      Donn Beard MD  Internal Medicine-Pediatrics  [facility] [address], [address]  P: [phone], F: [phone]

## 2024-06-19 NOTE — PATIENT INSTRUCTIONS
MRI of the brain   Stop in [address] to schedule or call [phone]     Neuropsychology testing  Neurology referral   Call [phone]     Mammogram in August as scheduled     Repeat labs in middle of July    Blood pressure readings improved    Continue current medication management     Follow up with me in 3-4 months!    Dr. XANDER Patel

## 2024-06-20 ENCOUNTER — HOSPITAL ENCOUNTER (OUTPATIENT)
Dept: RADIOLOGY | Facility: CLINIC | Age: 74
Discharge: HOME | End: 2024-06-20
Payer: MEDICARE

## 2024-06-20 DIAGNOSIS — G31.84 MILD COGNITIVE IMPAIRMENT: ICD-10-CM

## 2024-06-20 PROCEDURE — 70551 MRI BRAIN STEM W/O DYE: CPT

## 2024-06-24 VITALS
BODY MASS INDEX: 24.49 KG/M2 | WEIGHT: 129.6 LBS | DIASTOLIC BLOOD PRESSURE: 80 MMHG | RESPIRATION RATE: 16 BRPM | HEART RATE: 65 BPM | SYSTOLIC BLOOD PRESSURE: 120 MMHG

## 2024-06-24 ASSESSMENT — PATIENT HEALTH QUESTIONNAIRE - PHQ9
2. FEELING DOWN, DEPRESSED OR HOPELESS: NOT AT ALL
1. LITTLE INTEREST OR PLEASURE IN DOING THINGS: NOT AT ALL
SUM OF ALL RESPONSES TO PHQ9 QUESTIONS 1 AND 2: 0

## 2024-07-12 ENCOUNTER — LAB (OUTPATIENT)
Dept: LAB | Facility: LAB | Age: 74
End: 2024-07-12
Payer: MEDICARE

## 2024-07-12 DIAGNOSIS — G31.84 MILD COGNITIVE IMPAIRMENT: ICD-10-CM

## 2024-07-12 DIAGNOSIS — E78.5 DYSLIPIDEMIA: ICD-10-CM

## 2024-07-12 LAB
ALBUMIN SERPL BCP-MCNC: 4.3 G/DL (ref 3.4–5)
ALP SERPL-CCNC: 49 U/L (ref 33–136)
ALT SERPL W P-5'-P-CCNC: 37 U/L (ref 7–45)
ANION GAP SERPL CALC-SCNC: 12 MMOL/L (ref 10–20)
AST SERPL W P-5'-P-CCNC: 29 U/L (ref 9–39)
BILIRUB SERPL-MCNC: 0.5 MG/DL (ref 0–1.2)
BUN SERPL-MCNC: 17 MG/DL (ref 6–23)
CALCIUM SERPL-MCNC: 9.3 MG/DL (ref 8.6–10.6)
CHLORIDE SERPL-SCNC: 105 MMOL/L (ref 98–107)
CHOLEST SERPL-MCNC: 166 MG/DL (ref 0–199)
CHOLESTEROL/HDL RATIO: 2.3
CO2 SERPL-SCNC: 29 MMOL/L (ref 21–32)
CREAT SERPL-MCNC: 1.2 MG/DL (ref 0.5–1.05)
EGFRCR SERPLBLD CKD-EPI 2021: 47 ML/MIN/1.73M*2
GLUCOSE SERPL-MCNC: 72 MG/DL (ref 74–99)
HDLC SERPL-MCNC: 73.6 MG/DL
LDLC SERPL CALC-MCNC: 79 MG/DL
NON HDL CHOLESTEROL: 92 MG/DL (ref 0–149)
POTASSIUM SERPL-SCNC: 4.6 MMOL/L (ref 3.5–5.3)
PROT SERPL-MCNC: 6.1 G/DL (ref 6.4–8.2)
SODIUM SERPL-SCNC: 141 MMOL/L (ref 136–145)
TREPONEMA PALLIDUM IGG+IGM AB [PRESENCE] IN SERUM OR PLASMA BY IMMUNOASSAY: NONREACTIVE
TRIGL SERPL-MCNC: 69 MG/DL (ref 0–149)
TSH SERPL-ACNC: 1.9 MIU/L (ref 0.44–3.98)
VIT B12 SERPL-MCNC: 522 PG/ML (ref 211–911)
VLDL: 14 MG/DL (ref 0–40)

## 2024-07-12 PROCEDURE — 80053 COMPREHEN METABOLIC PANEL: CPT

## 2024-07-12 PROCEDURE — 82607 VITAMIN B-12: CPT

## 2024-07-12 PROCEDURE — 36415 COLL VENOUS BLD VENIPUNCTURE: CPT

## 2024-07-12 PROCEDURE — 84443 ASSAY THYROID STIM HORMONE: CPT

## 2024-07-12 PROCEDURE — 80061 LIPID PANEL: CPT

## 2024-07-12 PROCEDURE — 86780 TREPONEMA PALLIDUM: CPT

## 2024-08-13 DIAGNOSIS — I10 PRIMARY HYPERTENSION: ICD-10-CM

## 2024-08-14 DIAGNOSIS — I10 PRIMARY HYPERTENSION: ICD-10-CM

## 2024-08-14 RX ORDER — AMLODIPINE BESYLATE 5 MG/1
5 TABLET ORAL DAILY
Qty: 90 TABLET | Refills: 3 | Status: SHIPPED | OUTPATIENT
Start: 2024-08-14 | End: 2024-08-14 | Stop reason: SDUPTHER

## 2024-08-14 RX ORDER — AMLODIPINE BESYLATE 5 MG/1
5 TABLET ORAL DAILY
Qty: 10 TABLET | Refills: 0 | Status: SHIPPED | OUTPATIENT
Start: 2024-08-14

## 2024-08-20 ENCOUNTER — APPOINTMENT (OUTPATIENT)
Dept: RADIOLOGY | Facility: CLINIC | Age: 74
End: 2024-08-20
Payer: MEDICARE

## 2024-08-20 ENCOUNTER — HOSPITAL ENCOUNTER (OUTPATIENT)
Dept: RADIOLOGY | Facility: CLINIC | Age: 74
Discharge: HOME | End: 2024-08-20
Payer: MEDICARE

## 2024-08-20 VITALS — WEIGHT: 132 LBS | HEIGHT: 61 IN | BODY MASS INDEX: 24.92 KG/M2

## 2024-08-20 DIAGNOSIS — Z12.31 ENCOUNTER FOR SCREENING MAMMOGRAM FOR MALIGNANT NEOPLASM OF BREAST: ICD-10-CM

## 2024-08-20 PROCEDURE — 77067 SCR MAMMO BI INCL CAD: CPT | Performed by: RADIOLOGY

## 2024-08-20 PROCEDURE — 77067 SCR MAMMO BI INCL CAD: CPT

## 2024-08-20 PROCEDURE — 77063 BREAST TOMOSYNTHESIS BI: CPT | Performed by: RADIOLOGY

## 2024-09-06 ENCOUNTER — TELEPHONE (OUTPATIENT)
Dept: PRIMARY CARE | Facility: CLINIC | Age: 74
End: 2024-09-06

## 2024-09-09 NOTE — ADDENDUM NOTE
Addended by: JOSE ROBERTO FERREIRA on: 9/8/2024 08:13 PM     Modules accepted: Orders, Level of Service

## 2024-09-17 ENCOUNTER — LAB (OUTPATIENT)
Dept: LAB | Facility: LAB | Age: 74
End: 2024-09-17
Payer: MEDICARE

## 2024-09-17 DIAGNOSIS — I10 ESSENTIAL (PRIMARY) HYPERTENSION: ICD-10-CM

## 2024-09-17 DIAGNOSIS — R80.0 ISOLATED PROTEINURIA: ICD-10-CM

## 2024-09-17 DIAGNOSIS — E55.9 VITAMIN D DEFICIENCY, UNSPECIFIED: ICD-10-CM

## 2024-09-17 DIAGNOSIS — N18.30 CHRONIC KIDNEY DISEASE, STAGE 3 UNSPECIFIED (MULTI): Primary | ICD-10-CM

## 2024-09-17 LAB
25(OH)D3 SERPL-MCNC: 50 NG/ML (ref 30–100)
ALBUMIN SERPL BCP-MCNC: 4 G/DL (ref 3.4–5)
ANION GAP SERPL CALC-SCNC: 11 MMOL/L (ref 10–20)
APPEARANCE UR: CLEAR
BILIRUB UR STRIP.AUTO-MCNC: NEGATIVE MG/DL
BUN SERPL-MCNC: 14 MG/DL (ref 6–23)
CALCIUM SERPL-MCNC: 9.4 MG/DL (ref 8.6–10.6)
CHLORIDE SERPL-SCNC: 104 MMOL/L (ref 98–107)
CO2 SERPL-SCNC: 29 MMOL/L (ref 21–32)
COLOR UR: NORMAL
CREAT SERPL-MCNC: 1.06 MG/DL (ref 0.5–1.05)
CREAT UR-MCNC: 83.3 MG/DL (ref 20–320)
EGFRCR SERPLBLD CKD-EPI 2021: 55 ML/MIN/1.73M*2
ERYTHROCYTE [DISTWIDTH] IN BLOOD BY AUTOMATED COUNT: 12.9 % (ref 11.5–14.5)
GLUCOSE SERPL-MCNC: 94 MG/DL (ref 74–99)
GLUCOSE UR STRIP.AUTO-MCNC: NORMAL MG/DL
HCT VFR BLD AUTO: 39.6 % (ref 36–46)
HGB BLD-MCNC: 12.9 G/DL (ref 12–16)
KETONES UR STRIP.AUTO-MCNC: NEGATIVE MG/DL
LEUKOCYTE ESTERASE UR QL STRIP.AUTO: NEGATIVE
MCH RBC QN AUTO: 30.4 PG (ref 26–34)
MCHC RBC AUTO-ENTMCNC: 32.6 G/DL (ref 32–36)
MCV RBC AUTO: 93 FL (ref 80–100)
MICROALBUMIN UR-MCNC: 8.3 MG/L
MICROALBUMIN/CREAT UR: 10 UG/MG CREAT
NITRITE UR QL STRIP.AUTO: NEGATIVE
NRBC BLD-RTO: 0 /100 WBCS (ref 0–0)
PH UR STRIP.AUTO: 5.5 [PH]
PHOSPHATE SERPL-MCNC: 3.8 MG/DL (ref 2.5–4.9)
PLATELET # BLD AUTO: 218 X10*3/UL (ref 150–450)
POTASSIUM SERPL-SCNC: 4.4 MMOL/L (ref 3.5–5.3)
PROT UR STRIP.AUTO-MCNC: NEGATIVE MG/DL
PTH-INTACT SERPL-MCNC: 45.2 PG/ML (ref 18.5–88)
RBC # BLD AUTO: 4.24 X10*6/UL (ref 4–5.2)
RBC # UR STRIP.AUTO: NEGATIVE /UL
SODIUM SERPL-SCNC: 140 MMOL/L (ref 136–145)
SP GR UR STRIP.AUTO: 1.01
UROBILINOGEN UR STRIP.AUTO-MCNC: NORMAL MG/DL
WBC # BLD AUTO: 5.1 X10*3/UL (ref 4.4–11.3)

## 2024-09-17 PROCEDURE — 85027 COMPLETE CBC AUTOMATED: CPT

## 2024-09-17 PROCEDURE — 83970 ASSAY OF PARATHORMONE: CPT

## 2024-09-17 PROCEDURE — 81003 URINALYSIS AUTO W/O SCOPE: CPT

## 2024-09-17 PROCEDURE — 80069 RENAL FUNCTION PANEL: CPT

## 2024-09-17 PROCEDURE — 82306 VITAMIN D 25 HYDROXY: CPT

## 2024-09-17 PROCEDURE — 82043 UR ALBUMIN QUANTITATIVE: CPT

## 2024-09-17 PROCEDURE — 36415 COLL VENOUS BLD VENIPUNCTURE: CPT

## 2024-09-17 PROCEDURE — 82570 ASSAY OF URINE CREATININE: CPT

## 2024-09-19 ENCOUNTER — APPOINTMENT (OUTPATIENT)
Dept: PRIMARY CARE | Facility: CLINIC | Age: 74
End: 2024-09-19
Payer: MEDICARE

## 2024-09-19 VITALS
OXYGEN SATURATION: 97 % | HEART RATE: 70 BPM | HEIGHT: 61 IN | BODY MASS INDEX: 25.11 KG/M2 | SYSTOLIC BLOOD PRESSURE: 127 MMHG | WEIGHT: 133 LBS | DIASTOLIC BLOOD PRESSURE: 70 MMHG

## 2024-09-19 DIAGNOSIS — R41.3 MEMORY CHANGE: Primary | ICD-10-CM

## 2024-09-19 DIAGNOSIS — F41.8 DEPRESSION WITH ANXIETY: ICD-10-CM

## 2024-09-19 DIAGNOSIS — I10 PRIMARY HYPERTENSION: ICD-10-CM

## 2024-09-19 PROCEDURE — 1123F ACP DISCUSS/DSCN MKR DOCD: CPT | Performed by: STUDENT IN AN ORGANIZED HEALTH CARE EDUCATION/TRAINING PROGRAM

## 2024-09-19 PROCEDURE — 1126F AMNT PAIN NOTED NONE PRSNT: CPT | Performed by: STUDENT IN AN ORGANIZED HEALTH CARE EDUCATION/TRAINING PROGRAM

## 2024-09-19 PROCEDURE — 3074F SYST BP LT 130 MM HG: CPT | Performed by: STUDENT IN AN ORGANIZED HEALTH CARE EDUCATION/TRAINING PROGRAM

## 2024-09-19 PROCEDURE — 99213 OFFICE O/P EST LOW 20 MIN: CPT | Performed by: STUDENT IN AN ORGANIZED HEALTH CARE EDUCATION/TRAINING PROGRAM

## 2024-09-19 PROCEDURE — 1036F TOBACCO NON-USER: CPT | Performed by: STUDENT IN AN ORGANIZED HEALTH CARE EDUCATION/TRAINING PROGRAM

## 2024-09-19 PROCEDURE — 3078F DIAST BP <80 MM HG: CPT | Performed by: STUDENT IN AN ORGANIZED HEALTH CARE EDUCATION/TRAINING PROGRAM

## 2024-09-19 RX ORDER — AMLODIPINE BESYLATE 5 MG/1
10 TABLET ORAL DAILY
Qty: 180 TABLET | Refills: 0 | Status: SHIPPED | OUTPATIENT
Start: 2024-09-19

## 2024-09-19 ASSESSMENT — PATIENT HEALTH QUESTIONNAIRE - PHQ9
1. LITTLE INTEREST OR PLEASURE IN DOING THINGS: NOT AT ALL
2. FEELING DOWN, DEPRESSED OR HOPELESS: NOT AT ALL
SUM OF ALL RESPONSES TO PHQ9 QUESTIONS 1 AND 2: 0

## 2024-09-19 ASSESSMENT — PAIN SCALES - GENERAL: PAINLEVEL: 0-NO PAIN

## 2024-09-19 NOTE — PROGRESS NOTES
"Maribel Olmstead is a 74 y.o. female seen in Clinic at [facility] by Dr. Donn Beard on 09/19/24 for routine care, as well as for management of the following chronic medical conditions: Osteoporosis (on Fosamax), HTN, CKD III, DLD, Depression/Anxiety (well controlled), Insomnia. Patient presents today for follow up of multiple chronic medical conditions and ongoing cognitive concerns.     ACUTE CONCERNS:   #Cognitive Concerns  - per patient and her family   - MOCA in office 25/30 on 06/2024  - MRI brain without contrast without concerns   - TSH, B12, syph wnl 07/2024  - Seen by neurology, no concerns or further workup recommended  - notes depression/anxiety to be well controlled at present   - Patient states symptoms have significantly improved, feels as if she is in less of a \"fog\", reassured by workup     CHRONIC MEDICAL CONDITIONS:   #Osteoporosis  - last DEXA 12/2023   - started on Fosamax 12/2023  [  ] repeat DEXA due 12/2025  - normal Vitamin D per labs 11/2023     #HTN  - Home readings - 139/73, 157/74, 149/73. None in 120s  - BP readings improved on 5mg daily Amlodipine from 2.5  [  ] Increase to amlodipine 10 mg, continue home monitoring, RTC for recheck    #Depression/Anxiety   #Insomnia   - Wellbutrin 300mg daily, Lexapro 10mg daily, Trazodone 25mg at bedtime   - well controlled at present     #CKD III  - history of kidney donation remotely   - concurrent HTN as above  - renal function with Cr around 1.2 since at least 2019  - renal US reassuring 2022  - previously seen by nephrology, Dr. Juarez  - prior ARB (Losartan) intolerance per patient   - reassuring PTH, Urine Albumin 11/2023    #DLD   - worsening over last few years  - strong family history of CVA  - CAC scoring: scoring 17 in 05/2024  -  Rosuvastatin 10mg daily   - repeat labs post statin initiation with LDL improved to 79    Past Medical History: as above   No past medical history on file.  Subspecialty Medical Care: Nephrology ([facility])    Past " "Surgical History:   - Nephrectomy (family friend)  - Tonsillectomy   No past surgical history on file.    Medications:  Current Outpatient Medications:     alendronate (Fosamax) 70 mg tablet, Take 1 tablet (70 mg) by mouth every 7 days. Take in the morning with a full glass of water, on an empty stomach, and do not take anything else by mouth or lie down for the next 30 min., Disp: 4 tablet, Rfl: 11    amLODIPine (Norvasc) 5 mg tablet, Take 1 tablet (5 mg) by mouth once daily., Disp: 10 tablet, Rfl: 0    buPROPion XL (Wellbutrin XL) 300 mg 24 hr tablet, TAKE 1 TABLET BY MOUTH DAILY, Disp: 90 tablet, Rfl: 3    escitalopram (Lexapro) 10 mg tablet, TAKE 1 TABLET BY MOUTH ONCE  DAILY, Disp: 90 tablet, Rfl: 3    rosuvastatin (Crestor) 10 mg tablet, Take 1 tablet (10 mg) by mouth once daily., Disp: 100 tablet, Rfl: 3    traZODone (Desyrel) 50 mg tablet, Take 0.5 tablets (25 mg) by mouth once daily at bedtime., Disp: 45 tablet, Rfl: 1  Pharmacy: [facility] [place] [facility])     Allergies:   Allergies   Allergen Reactions    Lidocaine Itching     Eye swelling after cataract surgery    Morphine Itching     Immunizations:   - Flu annually   - COVID recommend staying UTD     Family History:   - CAD in father, CVA in father; passed away at 92, however  No family history on file.    Social History:   Home/Living Situation/Falls/Safety Assessment: from [place]-->[place]-->[place]-->[place]; lives at home at with , feels safe at home; daughter, 5.5 year old grandson   Education/Employment/Work/Vocational: Journalism; retired   Activities: 2 miles per day walking; regular exercises, has lapsed since last trip   Drug Use: remote smoker  Diet: stage III CKD, thoughtful regarding diet; uses \"Kidney Diet\" baljeet   Depression/Anxiety: known diagnosis, stable on current regimen   Sexuality/Contraception/Menstrual History:   Sleep: trazodone for insomnia     Patient Information:  Health Insurance: " "Medicare   Transportation: Drives   Healthcare POA/Guardian:    Contact Information: correct in EMR     Visit Vitals  /70 (BP Location: Left arm)   Pulse 70   Ht 1.549 m (5' 1\")   Wt 60.3 kg (133 lb)   SpO2 97%   BMI 25.13 kg/m²   OB Status Postmenopausal   Smoking Status Never   BSA 1.61 m²      PHYSICAL EXAM:   General: well appearing  female, NAD, pleasant and engaged in encounter    HEENT: NCAT, MMM  CV: RRR, no m/r/g  PULM: CTAB, non-labored respirations   ABD: soft, NT, ND, + bowel sounds   : no suprapubic or CVA tenderness   EXT: WWP, no significant edema   SKIN: no rashes noted   NEURO: A&Ox4, symmetric facies, no gross motor or sensory deficits, normal gait  PSYCH: pleasant mood, appropriate affect     Assessment/Plan    Maribel Olmstead is a 74 y.o. female seen in Clinic at [facility] by Dr. Donn Beard on 09/19/24 for routine care, as well as for management of the following chronic medical conditions: Osteoporosis (on Fosamax), HTN, CKD III, DLD, Depression/Anxiety (well controlled), Insomnia. Patient presents today for follow up of multiple chronic medical conditions and ongoing cognitive concerns.     ACUTE CONCERNS:   #Cognitive Concerns  - per patient and her family   - MOCA in office 25/30 on 06/2024  - MRI brain without contrast without concerns   - TSH, B12, syph wnl 07/2024  - Seen by neurology, no concerns or further workup recommended  - notes depression/anxiety to be well controlled at present   - Patient states symptoms have significantly improved, feels as if she is in less of a \"fog\", reassured by workup     CHRONIC MEDICAL CONDITIONS:   #Osteoporosis  - last DEXA 12/2023   - started on Fosamax 12/2023  [  ] repeat DEXA due 12/2025  - normal Vitamin D per labs 11/2023     #HTN  - Home readings - 139/73, 157/74, 149/73. None in 120s  - BP readings improved on 5mg daily Amlodipine from 2.5  [  ] Increase to amlodipine 10 mg, continue home monitoring, RTC for " recheck    #Depression/Anxiety   #Insomnia   - Wellbutrin 300mg daily, Lexapro 10mg daily, Trazodone 25mg at bedtime   - well controlled at present     #CKD III  #Hx of Nephrectomy  - history of kidney donation remotely   - concurrent HTN as above  - renal function with Cr around 1.2 since at least   - renal US reassuring   - previously seen by nephrology, Dr. Juarez  - prior ARB (Losartan) intolerance per patient   - reassuring PTH, Urine Albumin 2023    #DLD   - worsening over last few years  - strong family history of CVA  - CAC scoring: scoring 17 in 2024  -  Rosuvastatin 10mg daily   - repeat labs post statin initiation with LDL improved to 79    #Health Maintenance  Last MCW Visit: due 2024    Cancer Screening  - Mammography: completed 2024 and negative  - Colorectal Cancer Screening: last 10/2019, discussed 5 year repeat (unclear exact indications; appears there was some concern for microscopic colitis at that time but did not meet criteria; patient symptomatically feeling well at this time) --> discussed with GI, would be due at 10 year jatin in  if decision to pursue ongoing screening, around 10/2029   - Lung Cancer Screening: non-smoker     Laboratory Screening  - Lipid Screen: LDL at goal 2024  - ASCVD Score:   - A1C, glucose screen: FBG wnl   - STI, HIV, Hep B screen: defer  - Hep C screen: defer    Imaging Screening:  - Osteoporosis/DEXA screenin2023; 2025     Immunizations:   - Influenza: UTD   - COVID: UTD   - RSV: completed 2023  - Tdap: due   - Prevnar, Pneumovax: at least one dose ; consider PCV-20 in   - Shingrix: scheduled through pharmacy     Other Screening  - Health Literacy Assessment: excellent   - Depression screen: known diagnosis, doing well on current regimen   - Home safety/partner violence screen: no concerns   - Hearing/Vision screens: corrective lenses, follows with optho  - Alcohol/tobacco/drug use screen: non-smoker  -  Healthcare POA/Advanced Directives:        RTC in 3-4 months for follow up.     Patient seen and examined with attending physician, who agrees with above.     Gentry Beyer MD    Patient Discussion:    Please call back the office with any questions at [phone]. In the case of an emergency, please call 911 or go to the nearest Emergency Department.      Donn Beard MD  Internal Medicine-Pediatrics  [facility] [address] [place] [address], [address]  P: [phone], F: [phone]

## 2024-10-08 ENCOUNTER — TELEPHONE (OUTPATIENT)
Dept: PRIMARY CARE | Facility: CLINIC | Age: 74
End: 2024-10-08

## 2024-10-08 NOTE — TELEPHONE ENCOUNTER
Mrs Olmstead has been experiencing extreme swelling in her ankles since the increase of her amlodipine 5 mg to 10 mg, the patient has went back to her 5 mg tablets, but she said that her blood pressure has went up since she she has went back to taking 5 mg tablets.  Mrs. Olmstead wants to know what she should do?

## 2024-10-23 PROBLEM — R10.9 ABDOMINAL PAIN: Status: ACTIVE | Noted: 2024-10-23

## 2024-10-23 PROBLEM — Z52.4 KIDNEY DONOR: Status: ACTIVE | Noted: 2022-11-03

## 2024-10-23 PROBLEM — Z96.1 PSEUDOPHAKIA OF BOTH EYES: Status: ACTIVE | Noted: 2017-10-18

## 2024-10-23 PROBLEM — G47.00 INSOMNIA: Status: ACTIVE | Noted: 2024-10-23

## 2024-10-23 PROBLEM — E78.00 PURE HYPERCHOLESTEROLEMIA: Status: ACTIVE | Noted: 2024-10-23

## 2024-10-23 PROBLEM — B00.9 HERPES SIMPLEX: Status: ACTIVE | Noted: 2024-10-23

## 2024-10-23 PROBLEM — R05.9 COUGH: Status: RESOLVED | Noted: 2024-10-23 | Resolved: 2024-10-23

## 2024-10-23 PROBLEM — K63.8219 SMALL INTESTINAL BACTERIAL OVERGROWTH: Status: ACTIVE | Noted: 2024-10-23

## 2024-10-23 PROBLEM — R11.0 NAUSEA IN ADULT: Status: RESOLVED | Noted: 2024-10-23 | Resolved: 2024-10-23

## 2024-10-23 PROBLEM — F32.A DEPRESSIVE DISORDER: Status: ACTIVE | Noted: 2024-10-23

## 2024-10-23 PROBLEM — R11.0 NAUSEA: Status: RESOLVED | Noted: 2024-10-23 | Resolved: 2024-10-23

## 2024-10-23 PROBLEM — I10 PRIMARY HYPERTENSION: Status: ACTIVE | Noted: 2022-11-03

## 2024-10-23 PROBLEM — I10 BENIGN ESSENTIAL HTN: Status: ACTIVE | Noted: 2024-10-23

## 2024-10-23 PROBLEM — F41.9 ANXIETY AND DEPRESSION: Status: ACTIVE | Noted: 2022-11-03

## 2024-10-23 PROBLEM — E66.3 OVERWEIGHT (BMI 25.0-29.9): Status: ACTIVE | Noted: 2024-10-23

## 2024-10-23 PROBLEM — F32.A ANXIETY AND DEPRESSION: Status: ACTIVE | Noted: 2022-11-03

## 2024-10-23 PROBLEM — H60.509 ACUTE OTITIS EXTERNA: Status: RESOLVED | Noted: 2024-10-23 | Resolved: 2024-10-23

## 2024-10-23 PROBLEM — N18.31 STAGE 3A CHRONIC KIDNEY DISEASE (MULTI): Status: ACTIVE | Noted: 2022-11-03

## 2024-10-23 PROBLEM — J01.90 ACUTE SINUSITIS: Status: RESOLVED | Noted: 2024-10-23 | Resolved: 2024-10-23

## 2024-10-23 PROBLEM — R03.0 ELEVATED BLOOD PRESSURE READING: Status: RESOLVED | Noted: 2024-10-23 | Resolved: 2024-10-23

## 2024-10-23 PROBLEM — Z78.0 ASYMPTOMATIC POSTMENOPAUSAL STATUS: Status: ACTIVE | Noted: 2024-10-23

## 2024-10-23 PROBLEM — T30.0 BURN INJURY: Status: ACTIVE | Noted: 2024-10-23

## 2024-10-23 PROBLEM — R19.7 DIARRHEA: Status: RESOLVED | Noted: 2024-10-23 | Resolved: 2024-10-23

## 2024-10-23 PROBLEM — K58.9 IBS (IRRITABLE BOWEL SYNDROME): Status: ACTIVE | Noted: 2024-10-23

## 2024-10-23 PROBLEM — F32.A DEPRESSION: Status: ACTIVE | Noted: 2024-10-23

## 2024-10-23 PROBLEM — F33.9 RECURRENT MAJOR DEPRESSIVE DISORDER (CMS-HCC): Status: ACTIVE | Noted: 2024-10-23

## 2024-10-23 PROBLEM — M54.50 LOW BACK PAIN: Status: ACTIVE | Noted: 2024-10-23

## 2024-10-23 PROBLEM — R10.31 RIGHT LOWER QUADRANT ABDOMINAL PAIN: Status: ACTIVE | Noted: 2024-10-23

## 2024-10-23 RX ORDER — DICLOFENAC SODIUM 10 MG/G
GEL TOPICAL
COMMUNITY
Start: 2024-03-14

## 2024-10-30 ENCOUNTER — OFFICE VISIT (OUTPATIENT)
Dept: BEHAVIORAL HEALTH | Facility: CLINIC | Age: 74
End: 2024-10-30
Payer: MEDICARE

## 2024-10-30 ENCOUNTER — APPOINTMENT (OUTPATIENT)
Dept: BEHAVIORAL HEALTH | Facility: CLINIC | Age: 74
End: 2024-10-30
Payer: MEDICARE

## 2024-10-30 ENCOUNTER — APPOINTMENT (OUTPATIENT)
Dept: GERIATRIC MEDICINE | Facility: CLINIC | Age: 74
End: 2024-10-30
Payer: MEDICARE

## 2024-10-30 ENCOUNTER — SOCIAL WORK (OUTPATIENT)
Dept: GERIATRIC MEDICINE | Facility: CLINIC | Age: 74
End: 2024-10-30
Payer: MEDICARE

## 2024-10-30 VITALS
TEMPERATURE: 97.7 F | SYSTOLIC BLOOD PRESSURE: 131 MMHG | BODY MASS INDEX: 25.22 KG/M2 | HEART RATE: 69 BPM | DIASTOLIC BLOOD PRESSURE: 76 MMHG | RESPIRATION RATE: 20 BRPM | WEIGHT: 133.5 LBS

## 2024-10-30 DIAGNOSIS — R41.9 COGNITIVE COMPLAINTS: Primary | ICD-10-CM

## 2024-10-30 DIAGNOSIS — G31.84 MILD COGNITIVE IMPAIRMENT: ICD-10-CM

## 2024-10-30 PROCEDURE — 3075F SYST BP GE 130 - 139MM HG: CPT | Performed by: PSYCHIATRY & NEUROLOGY

## 2024-10-30 PROCEDURE — 1123F ACP DISCUSS/DSCN MKR DOCD: CPT | Performed by: PSYCHIATRY & NEUROLOGY

## 2024-10-30 PROCEDURE — 3078F DIAST BP <80 MM HG: CPT | Performed by: PSYCHIATRY & NEUROLOGY

## 2024-10-30 PROCEDURE — 1160F RVW MEDS BY RX/DR IN RCRD: CPT | Performed by: PSYCHIATRY & NEUROLOGY

## 2024-10-30 PROCEDURE — 1126F AMNT PAIN NOTED NONE PRSNT: CPT | Performed by: PSYCHIATRY & NEUROLOGY

## 2024-10-30 PROCEDURE — 1036F TOBACCO NON-USER: CPT | Performed by: PSYCHIATRY & NEUROLOGY

## 2024-10-30 PROCEDURE — 1159F MED LIST DOCD IN RCRD: CPT | Performed by: PSYCHIATRY & NEUROLOGY

## 2024-10-30 PROCEDURE — 99214 OFFICE O/P EST MOD 30 MIN: CPT | Mod: AM | Performed by: PSYCHIATRY & NEUROLOGY

## 2024-10-30 PROCEDURE — 1170F FXNL STATUS ASSESSED: CPT | Performed by: PSYCHIATRY & NEUROLOGY

## 2024-10-30 PROCEDURE — 99204 OFFICE O/P NEW MOD 45 MIN: CPT | Performed by: PSYCHIATRY & NEUROLOGY

## 2024-10-30 RX ORDER — LISINOPRIL 10 MG/1
10 TABLET ORAL DAILY
COMMUNITY

## 2024-10-30 RX ORDER — PSYLLIUM HUSK 0.4 G
1 CAPSULE ORAL DAILY
COMMUNITY

## 2024-10-30 ASSESSMENT — GERIATRIC DEPRESSION SCALE SHORT VERSION (GDS-SV)
DO YOU THINK THAT MOST PEOPLE ARE BETTER OFF THAN YOU ARE: NO
DO YOU FEEL HAPPY MOST OF THE TIME: YES
DO YOU FEEL PRETTY WORTHLESS THE WAY YOU ARE NOW: NO
DO YOU FEEL FULL OF ENERGY: YES
DO YOU OFTEN FEEL HELPLESS: NO
DO YOU FEEL YOU HAVE MORE PROBLEMS WITH MEMORY THAN MOST: NO
ARE YOU AFRAID THAT SOMETHING BAD IS GOING TO HAPPEN TO YOU: NO
DO YOU FEEL THAT YOUR LIFE IS EMPTY: NO
ARE YOU IN GOOD SPIRITS MOST OF THE TIME: YES
DO YOU OFTEN GET BORED: NO
DO YOU PREFER TO STAY AT HOME, RATHER THAN GOING OUT AND DOING NEW THINGS: NO
DO YOU FEEL THAT YOUR SITUATION IS HOPELESS: NO
HAVE YOU DROPPED MANY OF YOUR ACTIVITIES AND INTERESTS?: NO
DO YOU THINK IT IS WONDERFUL TO BE ALIVE NOW: YES
ARE YOU BASICALLY SATISFIED WITH YOUR LIFE: YES
GDS TOTAL SCORE: 0

## 2024-10-30 ASSESSMENT — MONTREAL COGNITIVE ASSESSMENT (MOCA)
4. NAME EACH OF THE THREE ANIMALS SHOWN: 3
6. READ LIST OF DIGITS [FORWARD/BACKWARD]: 2
9. REPEAT EACH SENTENCE: 2
5. MEMORY TRIALS: 0
WHAT IS THE TOTAL SCORE (OUT OF 30): 27
WHAT LEVEL OF EDUCATION WAS ATTAINED: 0
11. FOR EACH PAIR OF WORDS, WHAT CATEGORY DO THEY BELONG TO (OUT OF 2): 2
10. [FLUENCY] NAME WORDS STARTING WITH DESIGNATED LETTER: 1
7. [VIGILENCE] TAP WHEN HEARING DESIGNATED LETTER: 1
8. SERIAL SUBTRACTION OF 7S: 3
VISUOSPATIAL/EXECUTIVE SUBSCORE: 3
13. ORIENTATION SUBSCORE: 5
12. MEMORY INDEX SCORE: 5

## 2024-10-30 ASSESSMENT — ACTIVITIES OF DAILY LIVING (ADL)
DRESSING YOURSELF: INDEPENDENT
DOING_HOUSEWORK: INDEPENDENT
TOILETING: INDEPENDENT
PREPARING_MEALS: INDEPENDENT
GROOMING: INDEPENDENT
PATIENT'S MEMORY ADEQUATE TO SAFELY COMPLETE DAILY ACTIVITIES?: YES
PILL_BOX_USED: YES
EATING: INDEPENDENT
JUDGMENT_ADEQUATE_SAFELY_COMPLETE_DAILY_ACTIVITIES: YES
USING_TELEPHONE: INDEPENDENT
GROCERY_SHOPPING: INDEPENDENT
FEEDING YOURSELF: INDEPENDENT
USING_TRANSPORTATION: INDEPENDENT
WALKS IN HOME: INDEPENDENT
NEEDS_ASSISTANCE_WITH_FOOD: INDEPENDENT
MANAGING_FINANCES: TOTAL CARE
HEARING - RIGHT EAR: DIFFICULTY WITH NOISE
ADEQUATE_TO_COMPLETE_ADL: YES
HEARING - LEFT EAR: DIFFICULTY WITH NOISE
BATHING: INDEPENDENT
TAKING_MEDICATION: INDEPENDENT
STILL_DRIVING: YES

## 2024-10-30 ASSESSMENT — ENCOUNTER SYMPTOMS
OCCASIONAL FEELINGS OF UNSTEADINESS: 0
LOSS OF SENSATION IN FEET: 0

## 2024-10-30 ASSESSMENT — PAIN SCALES - GENERAL: PAINLEVEL_OUTOF10: 0-NO PAIN

## 2024-11-20 ENCOUNTER — LAB (OUTPATIENT)
Dept: LAB | Facility: LAB | Age: 74
End: 2024-11-20
Payer: MEDICARE

## 2024-11-20 DIAGNOSIS — N18.30 CHRONIC KIDNEY DISEASE, STAGE 3 UNSPECIFIED (MULTI): Primary | ICD-10-CM

## 2024-11-20 LAB
ALBUMIN SERPL BCP-MCNC: 4.2 G/DL (ref 3.4–5)
ANION GAP SERPL CALC-SCNC: 11 MMOL/L (ref 10–20)
BUN SERPL-MCNC: 19 MG/DL (ref 6–23)
CALCIUM SERPL-MCNC: 9.3 MG/DL (ref 8.6–10.6)
CHLORIDE SERPL-SCNC: 107 MMOL/L (ref 98–107)
CO2 SERPL-SCNC: 30 MMOL/L (ref 21–32)
CREAT SERPL-MCNC: 1.2 MG/DL (ref 0.5–1.05)
EGFRCR SERPLBLD CKD-EPI 2021: 47 ML/MIN/1.73M*2
GLUCOSE SERPL-MCNC: 85 MG/DL (ref 74–99)
PHOSPHATE SERPL-MCNC: 3.9 MG/DL (ref 2.5–4.9)
POTASSIUM SERPL-SCNC: 4.3 MMOL/L (ref 3.5–5.3)
SODIUM SERPL-SCNC: 144 MMOL/L (ref 136–145)

## 2024-11-20 PROCEDURE — 36415 COLL VENOUS BLD VENIPUNCTURE: CPT

## 2024-11-20 PROCEDURE — 80069 RENAL FUNCTION PANEL: CPT

## 2024-12-07 ENCOUNTER — TELEPHONE (OUTPATIENT)
Dept: PRIMARY CARE | Facility: CLINIC | Age: 74
End: 2024-12-07
Payer: MEDICARE

## 2025-01-06 DIAGNOSIS — M81.0 AGE-RELATED OSTEOPOROSIS WITHOUT CURRENT PATHOLOGICAL FRACTURE: ICD-10-CM

## 2025-01-07 ENCOUNTER — TELEPHONE (OUTPATIENT)
Dept: PRIMARY CARE | Facility: CLINIC | Age: 75
End: 2025-01-07

## 2025-01-11 RX ORDER — ALENDRONATE SODIUM 70 MG/1
TABLET ORAL
Qty: 12 TABLET | Refills: 3 | Status: SHIPPED | OUTPATIENT
Start: 2025-01-11

## 2025-02-12 ENCOUNTER — APPOINTMENT (OUTPATIENT)
Dept: PRIMARY CARE | Facility: CLINIC | Age: 75
End: 2025-02-12
Payer: MEDICARE

## 2025-02-12 VITALS
SYSTOLIC BLOOD PRESSURE: 135 MMHG | HEART RATE: 76 BPM | BODY MASS INDEX: 24.79 KG/M2 | WEIGHT: 134.7 LBS | HEIGHT: 62 IN | DIASTOLIC BLOOD PRESSURE: 74 MMHG | OXYGEN SATURATION: 95 %

## 2025-02-12 DIAGNOSIS — Z00.00 MEDICARE ANNUAL WELLNESS VISIT, SUBSEQUENT: Primary | ICD-10-CM

## 2025-02-12 DIAGNOSIS — I10 ESSENTIAL HYPERTENSION: ICD-10-CM

## 2025-02-12 DIAGNOSIS — I10 PRIMARY HYPERTENSION: ICD-10-CM

## 2025-02-12 DIAGNOSIS — F41.8 DEPRESSION WITH ANXIETY: ICD-10-CM

## 2025-02-12 DIAGNOSIS — Z12.31 ENCOUNTER FOR SCREENING MAMMOGRAM FOR MALIGNANT NEOPLASM OF BREAST: ICD-10-CM

## 2025-02-12 DIAGNOSIS — M81.0 OSTEOPOROSIS, UNSPECIFIED OSTEOPOROSIS TYPE, UNSPECIFIED PATHOLOGICAL FRACTURE PRESENCE: ICD-10-CM

## 2025-02-12 DIAGNOSIS — E78.5 DYSLIPIDEMIA: ICD-10-CM

## 2025-02-12 DIAGNOSIS — N18.31 STAGE 3A CHRONIC KIDNEY DISEASE (MULTI): ICD-10-CM

## 2025-02-12 PROCEDURE — 3078F DIAST BP <80 MM HG: CPT | Performed by: STUDENT IN AN ORGANIZED HEALTH CARE EDUCATION/TRAINING PROGRAM

## 2025-02-12 PROCEDURE — 1170F FXNL STATUS ASSESSED: CPT | Performed by: STUDENT IN AN ORGANIZED HEALTH CARE EDUCATION/TRAINING PROGRAM

## 2025-02-12 PROCEDURE — 1125F AMNT PAIN NOTED PAIN PRSNT: CPT | Performed by: STUDENT IN AN ORGANIZED HEALTH CARE EDUCATION/TRAINING PROGRAM

## 2025-02-12 PROCEDURE — 1160F RVW MEDS BY RX/DR IN RCRD: CPT | Performed by: STUDENT IN AN ORGANIZED HEALTH CARE EDUCATION/TRAINING PROGRAM

## 2025-02-12 PROCEDURE — G0439 PPPS, SUBSEQ VISIT: HCPCS | Performed by: STUDENT IN AN ORGANIZED HEALTH CARE EDUCATION/TRAINING PROGRAM

## 2025-02-12 PROCEDURE — 1123F ACP DISCUSS/DSCN MKR DOCD: CPT | Performed by: STUDENT IN AN ORGANIZED HEALTH CARE EDUCATION/TRAINING PROGRAM

## 2025-02-12 PROCEDURE — 1159F MED LIST DOCD IN RCRD: CPT | Performed by: STUDENT IN AN ORGANIZED HEALTH CARE EDUCATION/TRAINING PROGRAM

## 2025-02-12 PROCEDURE — 99214 OFFICE O/P EST MOD 30 MIN: CPT | Performed by: STUDENT IN AN ORGANIZED HEALTH CARE EDUCATION/TRAINING PROGRAM

## 2025-02-12 PROCEDURE — 1036F TOBACCO NON-USER: CPT | Performed by: STUDENT IN AN ORGANIZED HEALTH CARE EDUCATION/TRAINING PROGRAM

## 2025-02-12 PROCEDURE — 3075F SYST BP GE 130 - 139MM HG: CPT | Performed by: STUDENT IN AN ORGANIZED HEALTH CARE EDUCATION/TRAINING PROGRAM

## 2025-02-12 RX ORDER — ALBUTEROL SULFATE 90 UG/1
INHALANT RESPIRATORY (INHALATION)
COMMUNITY
Start: 2024-12-03

## 2025-02-12 RX ORDER — AMLODIPINE BESYLATE 2.5 MG/1
1 TABLET ORAL
COMMUNITY
Start: 2025-01-06 | End: 2025-02-12 | Stop reason: DRUGHIGH

## 2025-02-12 RX ORDER — AMLODIPINE BESYLATE 10 MG/1
10 TABLET ORAL
Qty: 90 TABLET | Refills: 1 | Status: SHIPPED | OUTPATIENT
Start: 2025-02-12

## 2025-02-12 ASSESSMENT — ENCOUNTER SYMPTOMS
DEPRESSION: 0
OCCASIONAL FEELINGS OF UNSTEADINESS: 0
LOSS OF SENSATION IN FEET: 0

## 2025-02-12 ASSESSMENT — ACTIVITIES OF DAILY LIVING (ADL)
DOING_HOUSEWORK: INDEPENDENT
DRESSING: INDEPENDENT
MANAGING_FINANCES: INDEPENDENT
BATHING: INDEPENDENT
TAKING_MEDICATION: INDEPENDENT
GROCERY_SHOPPING: INDEPENDENT

## 2025-02-12 ASSESSMENT — PAIN SCALES - GENERAL: PAINLEVEL_OUTOF10: 4

## 2025-02-12 NOTE — PATIENT INSTRUCTIONS
Mammogram due in August  DEXA (bone density) scan due in December    Stop in [address] or call [phone] to schedule    Please verify with Dr. Juarez what he believes your blood pressure regimen to be, as well as verify on your pill bottles at home what you are taking    I am under the impression you are taking Amlodipine 10mg once daily  The other medication seen on my chart review was that you were prescribed Lisinopril 10mg once daily--this is the medication I suspect you noted did not agree with you    Second Shingles vaccine through your pharmacy     Reach out to me in June when you are getting ready to see Dr. Juarez next and I will order updated labs for you!    All the best,  Dr. TERRELL

## 2025-02-12 NOTE — PROGRESS NOTES
"Maribel Olmstead is a 74 y.o. female seen in Clinic at [facility] by Dr. Donn Beard on 25 for routine care, as well as for management of the following chronic medical conditions: Osteoporosis (on Fosamax), HTN, CKD III, DLD, Depression/Anxiety (well controlled), Insomnia. Patient presents today for CPE/MCW visit.     Interim:   -seen by behavioral health/geriatrics: MOCA 10/30/24 - , overall reassuring  -just got back from 3 week trip to [place]    ACUTE CONCERNS  #low back pain  -due to heavy lifting  [  ] recommended hot packs, topical voltaren (counseled on use sparingly given kidney numbers)   [  ] encouraged exercise  [  ] consider PT if doesn't improve    #congestion  -started after viral illness 2024  -productive cough improved but congestion has persisted  [  ] trial OTC flonase     CHRONIC CONCERNS:   #Cognitive Concerns  - per patient and her family   - MOCA in office  on 2024, repeat 10/2024:  -reassuring   - stop ban   - MRI brain without contrast without concerns   - TSH, B12, syph wnl 2024  - Seen by neurology, no concerns or further workup recommended  - notes depression/anxiety to be well controlled at present   - Patient states symptoms have significantly improved, feels as if she is in less of a \"fog\", reassured by workup     #Osteoporosis  - last DEXA 2023   - started on Fosamax 2023, sometimes forgets to take  [  ] repeat DEXA due 2025  - normal Vitamin D per labs 2024     #HTN  - Home readings - 139/73, 157/74, 149/73. None in 120s at last visit  - Home bp readings today: 130s-140s/70s (more in 130s than 140s)   - previous trial of lisinopril 10mg, did not like the way it made her feel  - amlo 10 -- clarify home regimen, as appears nephrologist attempted ACE-I trial in interim   [  ] home BP monitoring, repeat labwork around     #Depression/Anxiety   #Insomnia   - Wellbutrin 300mg daily, Lexapro 10mg daily, Trazodone 25mg at bedtime   - well " controlled at present     #CKD III  - history of kidney donation remotely   - concurrent HTN as above  - renal function with Cr around 1.2 since at least 2019  - renal US reassuring 2022  - previously seen by nephrology, Dr. Juarez  - prior ARB (Losartan) intolerance per patient   - reassuring PTH, Urine Albumin 9/2024  - RFP 11/2024 at baseline  [ ] repeat labwork around June    #DLD   - worsening over last few years  - strong family history of CVA  - CAC scoring: scoring 17 in 05/2024  - Rosuvastatin 10mg daily   - repeat labs post statin initiation with LDL improved to 79  [  ] repeat lipid panel around June    Past Medical History: as above   Past Medical History:   Diagnosis Date    Acute otitis externa 10/23/2024    Acute sinusitis 10/23/2024    Elevated blood pressure reading 10/23/2024    Nausea 10/23/2024    Pneumonia 12/2024     Subspecialty Medical Care: Nephrology ([facility]ry)    Past Surgical History:   - Nephrectomy (family friend)  - Tonsillectomy   History reviewed. No pertinent surgical history.    Medications:  Current Outpatient Medications:     albuterol 90 mcg/actuation inhaler, 1-2 puffs every 4-6 hours as needed for wheezing, shortness of breath, or chest tightness, Disp: , Rfl:     alendronate (Fosamax) 70 mg tablet, TAKE 1 TABLET BY MOUTH WEEKLY  WITH 8 OZ OF PLAIN WATER 30  MINUTES BEFORE FIRST FOOD, DRINK OR MEDS. STAY UPRIGHT FOR 30  MINS, Disp: 12 tablet, Rfl: 3    buPROPion XL (Wellbutrin XL) 300 mg 24 hr tablet, TAKE 1 TABLET BY MOUTH DAILY, Disp: 90 tablet, Rfl: 3    escitalopram (Lexapro) 10 mg tablet, TAKE 1 TABLET BY MOUTH ONCE  DAILY, Disp: 90 tablet, Rfl: 3    psyllium (Metamucil) 0.4 gram capsule, Take 1 capsule by mouth once daily., Disp: , Rfl:     rosuvastatin (Crestor) 10 mg tablet, Take 1 tablet (10 mg) by mouth once daily., Disp: 100 tablet, Rfl: 3    traZODone (Desyrel) 50 mg tablet, Take 0.5 tablets (25 mg) by mouth once daily at bedtime., Disp: 45 tablet, Rfl: 1    amLODIPine  "(Norvasc) 10 mg tablet, Take 1 tablet (10 mg) by mouth early in the morning.., Disp: 90 tablet, Rfl: 1  Ph[facility] [place] for [facility])     Allergies:   Allergies   Allergen Reactions    Lidocaine Itching     Eye swelling after cataract surgery    Morphine Itching     Immunizations:   - Flu annually   - COVID recommend staying UTD     Family History:   - CAD in father, CVA in father; passed away at 92, however  No family history on file.    Social History:   Home/Living Situation/Falls/Safety Assessment: from [place]-->[place]-->[place]; lives at home at with , feels safe at home; daughter, 5.5 year old grandson   Education/Employment/Work/Vocational: Journalism; retired   Activities: 2 miles per day walking; regular exercises, has lapsed since last trip   Drug Use: remote smoker  Diet: stage III CKD, thoughtful regarding diet; uses \"Kidney Diet\" baljeet   Depression/Anxiety: known diagnosis, stable on current regimen   Sexuality/Contraception/Menstrual History:   Sleep: trazodone for insomnia     Patient Information:  Health Insurance: Medicare   Transportation: [place] POA/Guardian:    Contact Information: correct in EMR     Visit Vitals  /74 (BP Location: Left arm, Patient Position: Sitting, BP Cuff Size: Adult)   Pulse 76   Ht 1.575 m (5' 2\")   Wt 61.1 kg (134 lb 11.2 oz)   SpO2 95%   BMI 24.64 kg/m²   OB Status Postmenopausal   Smoking Status Never   BSA 1.63 m²      PHYSICAL EXAM:   General: well appearing  female, NAD, pleasant and engaged in encounter    HEENT: NCAT, MMM  CV: RRR, no m/r/g  PULM: CTAB, non-labored respirations   ABD: soft, NT, ND, + bowel sounds   : no suprapubic or CVA tenderness   EXT: WWP, no significant edema   SKIN: no rashes noted   NEURO: A&Ox4, symmetric facies, no gross motor or sensory deficits, normal gait  PSYCH: pleasant mood, appropriate affect     Assessment/Plan    Maribel Ishan is a 74 " "y.o. female seen in Clinic at [facility] by Dr. Donn Beard on 25 for routine care, as well as for management of the following chronic medical conditions: Osteoporosis (on Fosamax), HTN, CKD III, DLD, Depression/Anxiety (well controlled), Insomnia. Patient presents today for CPE/MCW visit.     Interim:   -seen by behavioral health/geriatrics: MOCA 10/30/24 - , overall reassuring  -just got back from 3 week trip to [place]    ACUTE CONCERNS  #low back pain  -due to heavy lifting  [  ] recommended hot packs, topical voltaren (counseled on use sparingly given kidney numbers)   [  ] encouraged exercise  [  ] consider PT if doesn't improve    #congestion  -started after viral illness 2024  -productive cough improved but congestion has persisted  [  ] trial OTC flonase     CHRONIC CONCERNS:   #Cognitive Concerns  - per patient and her family   - MOCA in office  on 2024, repeat 10/2024:  -reassuring   - stop ban   - MRI brain without contrast without concerns   - TSH, B12, syph wnl 2024  - Seen by neurology, no concerns or further workup recommended  - notes depression/anxiety to be well controlled at present   - Patient states symptoms have significantly improved, feels as if she is in less of a \"fog\", reassured by workup     #Osteoporosis  - last DEXA 2023   - started on Fosamax 2023, sometimes forgets to take  [  ] repeat DEXA due 2025  - normal Vitamin D per labs 2024     #HTN  - Home readings - 139/73, 157/74, 149/73. None in 120s at last visit  - Home bp readings today: 130s-140s/70s (more in 130s than 140s)   - previous trial of lisinopril 10mg, did not like the way it made her feel  - amlo 10 -- clarify home regimen, as appears nephrologist attempted ACE-I trial in interim   [  ] home BP monitoring, repeat labwork around     #Depression/Anxiety   #Insomnia   - Wellbutrin 300mg daily, Lexapro 10mg daily, Trazodone 25mg at bedtime   - well controlled at present "     #CKD III  - history of kidney donation remotely   - concurrent HTN as above  - renal function with Cr around 1.2 since at least   - renal US reassuring   - previously seen by nephrology, Dr. Juarez  - prior ARB (Losartan) intolerance per patient   - reassuring PTH, Urine Albumin 2024  - RFP 2024 at baseline  [ ] repeat labwork around     #DLD   - worsening over last few years  - strong family history of CVA  - CAC scoring: scoring 17 in 2024  - Rosuvastatin 10mg daily   - repeat labs post statin initiation with LDL improved to 79  [  ] repeat lipid panel around     #Health Maintenance  CPE/MCW visit: 2025    Cancer Screening  - Mammography: completed 2024 and negative, interested in repeat due 2024  - Colorectal Cancer Screening: last 10/2019, discussed 5 year repeat (unclear exact indications; appears there was some concern for microscopic colitis at that time but did not meet criteria; patient symptomatically feeling well at this time) --> discussed with GI, would be due at 10 year jatin in  if decision to pursue ongoing screening, around 10/2029   - Lung Cancer Screening: non-smoker     Laboratory Screening  - Lipid Screen: LDL at goal 2024  - ASCVD Score: CAC score 17 2024 (0.4% annual risk)   - A1C, glucose screen: FBG wnl   - STI, HIV, Hep B screen: defer  - Hep C screen: defer    Imaging Screening:  - Osteoporosis/DEXA screenin2023; due 2025     Immunizations:   - Influenza: UTD   - COVID: UTD   - RSV: completed 2023  - Tdap: due   - Prevnar, Pneumovax: PCV 20 in   - Shingrix:   complete 2024 (next dose pending through pharmacy)     Other Screening  - Health Literacy Assessment: excellent   - Depression screen: known diagnosis, doing well on current regimen   - Home safety/partner violence screen: no concerns   - Hearing/Vision screens: corrective lenses, follows with optho  - Alcohol/tobacco/drug use screen: non-smoker  -  Healthcare POA/Advanced Directives:      RTC in 3-4 months for follow up, labs at that time.     Follow ups: flonase and voltaren trials, mammogram, DEXA, clarifying/confirm home BP regimen     Patient Discussion:    Please call back the office with any questions at [phone]. In the case of an emergency, please call 911 or go to the nearest Emergency Department.      Donn Beard MD  Internal Medicine-Pediatrics  [facility] [address], [address]  P: [phone], F: [phone]

## 2025-03-26 DIAGNOSIS — F33.9 MAJOR DEPRESSIVE DISORDER, RECURRENT EPISODE, UNSPECIFIED (CMS-HCC): ICD-10-CM

## 2025-03-26 DIAGNOSIS — E78.5 DYSLIPIDEMIA: ICD-10-CM

## 2025-03-27 RX ORDER — ESCITALOPRAM OXALATE 10 MG/1
10 TABLET ORAL DAILY
Qty: 90 TABLET | Refills: 3 | Status: SHIPPED | OUTPATIENT
Start: 2025-03-27

## 2025-03-27 RX ORDER — ROSUVASTATIN CALCIUM 10 MG/1
10 TABLET, COATED ORAL DAILY
Qty: 90 TABLET | Refills: 3 | Status: SHIPPED | OUTPATIENT
Start: 2025-03-27

## 2025-05-01 DIAGNOSIS — F41.8 DEPRESSION WITH ANXIETY: ICD-10-CM

## 2025-05-01 RX ORDER — BUPROPION HYDROCHLORIDE 300 MG/1
300 TABLET ORAL DAILY
Qty: 90 TABLET | Refills: 3 | Status: SHIPPED | OUTPATIENT
Start: 2025-05-01

## 2025-06-10 ENCOUNTER — APPOINTMENT (OUTPATIENT)
Dept: NEPHROLOGY | Facility: CLINIC | Age: 75
End: 2025-06-10
Payer: MEDICARE

## 2025-06-10 VITALS
TEMPERATURE: 97.2 F | OXYGEN SATURATION: 96 % | DIASTOLIC BLOOD PRESSURE: 81 MMHG | SYSTOLIC BLOOD PRESSURE: 154 MMHG | WEIGHT: 141 LBS | BODY MASS INDEX: 25.79 KG/M2 | HEART RATE: 71 BPM

## 2025-06-10 DIAGNOSIS — N18.31 STAGE 3A CHRONIC KIDNEY DISEASE (MULTI): Primary | ICD-10-CM

## 2025-06-10 PROCEDURE — 3079F DIAST BP 80-89 MM HG: CPT | Performed by: INTERNAL MEDICINE

## 2025-06-10 PROCEDURE — 1160F RVW MEDS BY RX/DR IN RCRD: CPT | Performed by: INTERNAL MEDICINE

## 2025-06-10 PROCEDURE — 1126F AMNT PAIN NOTED NONE PRSNT: CPT | Performed by: INTERNAL MEDICINE

## 2025-06-10 PROCEDURE — 1036F TOBACCO NON-USER: CPT | Performed by: INTERNAL MEDICINE

## 2025-06-10 PROCEDURE — 1159F MED LIST DOCD IN RCRD: CPT | Performed by: INTERNAL MEDICINE

## 2025-06-10 PROCEDURE — 3077F SYST BP >= 140 MM HG: CPT | Performed by: INTERNAL MEDICINE

## 2025-06-10 PROCEDURE — 99214 OFFICE O/P EST MOD 30 MIN: CPT | Performed by: INTERNAL MEDICINE

## 2025-06-10 ASSESSMENT — PAIN SCALES - GENERAL: PAINLEVEL_OUTOF10: 0-NO PAIN

## 2025-06-10 NOTE — PROGRESS NOTES
"Subjective   Maribel Olmstead \"Codey" is a 75 y.o. female who presented today to discuss her stage 3a chronic kidney disease having donated a kidney in 2002.  She does have hypertension, has had a tonsillectomy, left cataract procedure, bacterial overgrowth issues in 2021.  She looks good today and denies nausea, vomiting, chest pain, or abdominal pain.  She has had shortness of breath on exertion in the past, less so as of late.  Last November she had the upper respiratory infection, the shortness of breath started at that time.    She is not on home blood pressures as of late.    ROS  As in Subjective, all other ROS are negative    Objective     Vital signs    Visit Vitals  /81   Pulse 71   Temp 36.2 °C (97.2 °F) (Temporal)      Vitals:    06/10/25 1316   Weight: 64 kg (141 lb)        Physical Exam  Constitutional:       Appearance: Normal appearance.   HENT:      Mouth/Throat:      Mouth: Mucous membranes are moist.   Eyes:      Extraocular Movements: Extraocular movements intact.      Pupils: Pupils are equal, round, and reactive to light.   Cardiovascular:      Rate and Rhythm: Regular rhythm.      Heart sounds: S1 normal and S2 normal.   Pulmonary:      Breath sounds: Normal breath sounds.   Abdominal:      Comments: Soft, NT/ND, no masses, normal bowel sounds.  Abdominal scar noted.  Genitourinary:     Comments: No vaughn  Musculoskeletal:      No synovitis  Edema:     Right lower leg: No edema.      Left lower leg: No edema.   Skin:     General: Skin is warm and dry.   Neurological:      General: No focal deficit present.      Mental Status: She is alert and oriented to person, place, and time.   Psychiatric:         Behavior: Behavior normal.      Meds  Current Medications[1]     Allergies  RX Allergies[2]     Results  Lab Results   Component Value Date    GLUCOSE 85 11/20/2024     11/20/2024    K 4.3 11/20/2024     11/20/2024    CO2 30 11/20/2024    ANIONGAP 11 11/20/2024    BUN 19 11/20/2024 " "   CREATININE 1.20 (H) 11/20/2024    GFRF 50 (A) 09/18/2023    CALCIUM 9.3 11/20/2024     Lab Results   Component Value Date    PTH 45.2 09/17/2024    CALCIUM 9.3 11/20/2024     No results found for: \"ALBUR\", \"[id number]\"         @LABALLVALUEIP(CREATININE:*)@  @LABALLVALUEIP(NA:*)@    Imaging results  === 09/09/22 ===    US RENAL COMPLETE    - Impression -  1.  Unremarkable sonographic appearance of the left kidney.  2. Small postvoid residual volume of 10 mL.  3. Status post right nephrectomy.    I personally reviewed the images/study and I agree with the findings  as stated by resident physician Dr. Quan Cardenas.     Assessment and Plan  Maribel Olmstead \"Meron\" has stable stage 3a chronic kidney disease having donated a kidney.  I reviewed the prior renal ultrasound from 2022, no findings that would warrant a repeat study.  Last November creatinine 1.2 mg/dL, no acidosis, electrolytes look good.  25 vitamin D and intact PTH were at goal when checked last September, CBC within normal limits.  Urinalysis without blood or albumin.  She did her blood work yesterday, I will keep in touch with her regarding the results.  But am very pleased with how she is doing other than her blood pressure.  She will check them at home.  I anticipate lowering the amlodipine and adding a RAAS inhibitor.  But I will await her home blood pressure data.       Problem List Items Addressed This Visit       Stage 3a chronic kidney disease (Multi) - Primary    Relevant Orders    Follow Up In Nephrology    Albumin-Creatinine Ratio, Urine Random    Creatinine, Urine Random    Urinalysis with Reflex Microscopic    Protein, Urine Random    Renal Function Panel    Parathyroid Hormone, Intact      Lenny Juarez MD           [1]   Current Outpatient Medications:     alendronate (Fosamax) 70 mg tablet, TAKE 1 TABLET BY MOUTH WEEKLY  WITH 8 OZ OF PLAIN WATER 30  MINUTES BEFORE FIRST FOOD, DRINK OR MEDS. STAY UPRIGHT FOR 30  MINS, Disp: 12 tablet, Rfl: " 3    amLODIPine (Norvasc) 10 mg tablet, Take 1 tablet (10 mg) by mouth early in the morning.., Disp: 90 tablet, Rfl: 1    buPROPion XL (Wellbutrin XL) 300 mg 24 hr tablet, Take 1 tablet (300 mg) by mouth once daily., Disp: 90 tablet, Rfl: 3    escitalopram (Lexapro) 10 mg tablet, TAKE 1 TABLET BY MOUTH ONCE  DAILY, Disp: 90 tablet, Rfl: 3    psyllium (Metamucil) 0.4 gram capsule, Take 1 capsule by mouth once daily., Disp: , Rfl:     rosuvastatin (Crestor) 10 mg tablet, TAKE 1 TABLET BY MOUTH ONCE  DAILY, Disp: 90 tablet, Rfl: 3    traZODone (Desyrel) 50 mg tablet, Take 0.5 tablets (25 mg) by mouth once daily at bedtime., Disp: 45 tablet, Rfl: 1    albuterol 90 mcg/actuation inhaler, 1-2 puffs every 4-6 hours as needed for wheezing, shortness of breath, or chest tightness, Disp: , Rfl:   [2]   Allergies  Allergen Reactions    Lidocaine Itching     Eye swelling after cataract surgery    Morphine Itching

## 2025-07-08 DIAGNOSIS — G47.00 INSOMNIA, UNSPECIFIED TYPE: ICD-10-CM

## 2025-07-08 DIAGNOSIS — F41.8 DEPRESSION WITH ANXIETY: Primary | ICD-10-CM

## 2025-07-08 RX ORDER — TRAZODONE HYDROCHLORIDE 50 MG/1
25 TABLET ORAL NIGHTLY
Qty: 45 TABLET | Refills: 1 | Status: SHIPPED | OUTPATIENT
Start: 2025-07-08

## 2025-07-21 ENCOUNTER — APPOINTMENT (OUTPATIENT)
Dept: GERIATRIC MEDICINE | Facility: CLINIC | Age: 75
End: 2025-07-21
Payer: MEDICARE

## 2025-07-21 ENCOUNTER — OFFICE VISIT (OUTPATIENT)
Dept: BEHAVIORAL HEALTH | Facility: CLINIC | Age: 75
End: 2025-07-21
Payer: MEDICARE

## 2025-07-21 VITALS
WEIGHT: 142.9 LBS | HEART RATE: 71 BPM | RESPIRATION RATE: 20 BRPM | SYSTOLIC BLOOD PRESSURE: 175 MMHG | BODY MASS INDEX: 26.14 KG/M2 | DIASTOLIC BLOOD PRESSURE: 91 MMHG | TEMPERATURE: 97.3 F

## 2025-07-21 DIAGNOSIS — R41.9 COGNITIVE COMPLAINTS: ICD-10-CM

## 2025-07-21 PROCEDURE — 1126F AMNT PAIN NOTED NONE PRSNT: CPT | Performed by: PSYCHIATRY & NEUROLOGY

## 2025-07-21 PROCEDURE — 99213 OFFICE O/P EST LOW 20 MIN: CPT | Performed by: PSYCHIATRY & NEUROLOGY

## 2025-07-21 PROCEDURE — 3080F DIAST BP >= 90 MM HG: CPT | Performed by: PSYCHIATRY & NEUROLOGY

## 2025-07-21 PROCEDURE — 3077F SYST BP >= 140 MM HG: CPT | Performed by: PSYCHIATRY & NEUROLOGY

## 2025-07-21 PROCEDURE — 99213 OFFICE O/P EST LOW 20 MIN: CPT | Mod: AM | Performed by: PSYCHIATRY & NEUROLOGY

## 2025-07-21 PROCEDURE — 1159F MED LIST DOCD IN RCRD: CPT | Performed by: PSYCHIATRY & NEUROLOGY

## 2025-07-21 ASSESSMENT — MONTREAL COGNITIVE ASSESSMENT (MOCA)
5. MEMORY TRIALS: 0
VISUOSPATIAL/EXECUTIVE SUBSCORE: 3
12. MEMORY INDEX SCORE: 4
4. NAME EACH OF THE THREE ANIMALS SHOWN: 3
8. SERIAL SUBTRACTION OF 7S: 3
10. [FLUENCY] NAME WORDS STARTING WITH DESIGNATED LETTER: 1
9. REPEAT EACH SENTENCE: 2
7. [VIGILENCE] TAP WHEN HEARING DESIGNATED LETTER: 1
6. READ LIST OF DIGITS [FORWARD/BACKWARD]: 2
WHAT IS THE TOTAL SCORE (OUT OF 30): 27
13. ORIENTATION SUBSCORE: 6
11. FOR EACH PAIR OF WORDS, WHAT CATEGORY DO THEY BELONG TO (OUT OF 2): 2
WHAT LEVEL OF EDUCATION WAS ATTAINED: 0

## 2025-07-21 ASSESSMENT — ENCOUNTER SYMPTOMS
LOSS OF SENSATION IN FEET: 0
OCCASIONAL FEELINGS OF UNSTEADINESS: 0
DEPRESSION: 0

## 2025-07-21 ASSESSMENT — PATIENT HEALTH QUESTIONNAIRE - PHQ9
SUM OF ALL RESPONSES TO PHQ9 QUESTIONS 1 AND 2: 0
2. FEELING DOWN, DEPRESSED OR HOPELESS: NOT AT ALL
1. LITTLE INTEREST OR PLEASURE IN DOING THINGS: NOT AT ALL

## 2025-07-21 ASSESSMENT — PAIN SCALES - GENERAL: PAINLEVEL_OUTOF10: 0-NO PAIN

## 2025-07-21 NOTE — PROGRESS NOTES
"[place], [place]      [facility] follow up visit:      Maribel Olmstead \"Codey"  is 75 y.o. -year-old with a history of depression, HTN, hyperlipidemia, CKD3, osteoporosis. Seen in office for follow up for cognitive concerns.    Subjective:      She says she is \"alright.     She says that her  tells her that she is forgetful. She says she may be \"a tiny bit more forgetful.\"   She says she does yoga and Sadiq-chi; in the past, she never had to memorize the steps but now she has not, and is finding that she struggles to memorize.     She denies any word-finding difficulties.   She says she has never been good with names and directions but does not think this has worsened. She uses GPS.   She does not forget appointments - she puts them in her phone.     Mood is \"good.\" She takes bupropion and escitalopram.   she does not have excessive worry or anxiety.   Appetite: okay. Weight is stable.   Sleep: okay. Takes trazodone 25mg at night. She says she does snore but thinks it is sinus congestion as it is worse in the summer.   Energy is okay. Exercises regularly .  Denies any death wishes or suicidal thoughts, intent or plans.     she does not have change in personality, social disinhibition, loss of empathy, change in dietary preferences, stereotyped or repetitive behaviors.     she does not have visual hallucinations, fluctuating cognition, tremors, REM sleep behavior disorder, falls.     she does not have urinary incontinence.     Reviewed elevated blood pressure - she noted she was somewhat nervous about the appointment; she is tracking her BP and will discuss it with her nephrologist. Denies any headaches, chest pain, dizziness, weakness etc.     Current medications:   Escitalopram 10mg daily  Wellbutrin XL 300mg daily  Trazodone 25mg at bedtime.    Functional changes:   Current living situation - Lives in home; lives with   Driving - Yes - denies any problems getting " lost or any accidents. Uses GPS.   Finances -  has always managed  Cooking - independently without problems. Has not had kitchen mishaps   ADLS - independent with all ADLs  Medications - uses a pillbox yes ; manages on her own    Psychiatric Review Of Systems:   As above.     Medical Review Of Systems:    Pertinent items are noted in HPI.    Prior Work-up:   July '24 - normal TSH, B12; negative syphilis screen  Neuropsychological testing - none  Brain Imaging - MRI brain - 6/20/24 - mild generalized volume loss; mild small vessel ischemic changes.   Amyloid status: n/a    Past Neuropsychiatric History:  Handedness: left  History of traumatic brain injury: No  History of seizures: No  History of stroke: No    Past psychiatric history:   Has had longstanding depression that has been well-controlled on medications managed by PCP.   No prior psychiatric hospitalizations.   No history of suicide attempts.     PMH/PSH:  Past Medical History:   Diagnosis Date    Acute otitis externa 10/23/2024    Acute sinusitis 10/23/2024    Anxiety     Cataract     Chronic kidney disease     Depression     Elevated blood pressure reading 10/23/2024    Hypertension     Nausea 10/23/2024    Pneumonia 12/2024    Sleep difficulties         Meds  Current Outpatient Medications on File Prior to Visit   Medication Sig Dispense Refill    alendronate (Fosamax) 70 mg tablet TAKE 1 TABLET BY MOUTH WEEKLY  WITH 8 OZ OF PLAIN WATER 30  MINUTES BEFORE FIRST FOOD, DRINK OR MEDS. STAY UPRIGHT FOR 30  MINS 12 tablet 3    amLODIPine (Norvasc) 10 mg tablet Take 1 tablet (10 mg) by mouth early in the morning.. 90 tablet 1    buPROPion XL (Wellbutrin XL) 300 mg 24 hr tablet Take 1 tablet (300 mg) by mouth once daily. 90 tablet 3    escitalopram (Lexapro) 10 mg tablet TAKE 1 TABLET BY MOUTH ONCE  DAILY 90 tablet 3    psyllium (Metamucil) 0.4 gram capsule Take 1 capsule by mouth once daily.      rosuvastatin (Crestor) 10 mg tablet TAKE 1 TABLET BY MOUTH  "ONCE  DAILY 90 tablet 3    traZODone (Desyrel) 50 mg tablet Take 0.5 tablets (25 mg) by mouth once daily at bedtime. 45 tablet 1     No current facility-administered medications on file prior to visit.        Allergies:   Allergies   Allergen Reactions    Lidocaine Itching     Eye swelling after cataract surgery    Morphine Itching       Family history:   Psychiatric: father had depression.   Dementia: Father  at age 93 from stroke/dementia.   Parkinsons disease: No  Huntingtons disease: No  ALS: No    Mental Status Examination:  Appearance: Appears to be stated age. Well groomed. Good hygiene.   Behavior/Attitude: Cooperative. Pleasant.    Motor: Psychomotor activity in average range. No abnormal involuntary movements.    Speech: Regular in rate, tone and volume. No pressure.   Mood: \"good\"  Affect: Congruent to stated mood. Mobilized appropriately. Normal range.    Thought process: Goal-directed. Linear. Organized.   Thought content: No paranoia, delusion or ideas of reference elicited. No hallucinations in auditory, visual or other sensory modalities.    Suicidal ideation: denied.   Homicidal ideation: denied.   Insight: Fair  Judgment: Fair  Recent and remote memory: intact based on recall of recent and remote autobiographical memories.   Attention/concentration: intact during visit   Language: No aphasia or paraphasic errors during conversation      NEUROLOGICAL EXAMINATION  Cranial nerves:  Cranial nerve II: Visual fields full to confrontation.   Cranial nerves III, IV, and VI: Pupils round, equally reactive to light; no ptosis. EOMs intact. No nystagmus.   Cranial Nerve V: Facial sensation intact bilaterally.   Cranial nerve VII: Normal and symmetric facial strength.   Cranial nerve VIII: Hearing is intact bilaterally to tuning fork.   Cranial nerves IX and X: Palate elevates symmetrically.   Cranial nerve XI: Shoulder shrug and neck rotation strength are intact.   Cranial nerve XII: Tongue midline with " "normal strength.    Motor:   Normal muscle tone, bulk, and strength. No asymmetries noted.  Finger taps - normal  Hand opening/closing - normal  Tone - normal  Abnormal movements - No    Coordination:  Finger-nose-finger testing is normal and without tremor or dysmetria.    Gait: no ataxia, bradykinesia or spasticity. Normal arm swing. Turns in 1-2 steps .    Able to stand from seated position with arms across chest: Yes    Sensory:  Normal to light touch in all extremities    Romberg's sign: negative      MoCA- Anthony Cognitive Assessment :   MoCA  Visuospatial/Executive: 3 (got hands on clock wrong.)  Naming: 3  Memory (Score '0' as this is an Unscored Section): 0  Attention: Read List of Digits: 2  Attention: Read List of Letters: 1  Attention: Serial Sevens: 3 (got one wrong)  Language: Repeat: 2  Language: Fluency: 1 (15 words, repeated a few of them)  Abstraction: 2  Delayed Recall: 4 (mis 13/15)  Orientation: 6  Add 1 Point if </=12 yr Education: 0  MOCA Total Score: 27      Assessment/Plan   Assessment:   Maribel Olmstead \"Codey"  is 75 y.o. -year-old with a history of depression, HTN, hyperlipidemia, CKD3, osteoporosis. Seen in office today for follow up of complaints regarding cognitive changes.     Initial impression:   10/30/24 -   She is here for cognitive evaluation but does not have significant cognitive concerns besides being distractible, which is her baseline. No functional impairment.     Labs:   July '24 - normal TSH, B12; negative     Brain Imaging - MRI brain - 6/20/24 - mild generalized volume loss; mild small vessel ischemic changes.     MoCA:   10/30/24 - 27/30 (3/5 visuospatial/executive; 5/5 delayed recall). Although there could have been some practice effect, her intact delayed recall is reassuring.   7/21/25 - 27/30 (3/5 visuospatial/executive; 4/5 delayed recall, MIS 14/15)    7/21/25 - stable functioning. Still has slight subjective concern but no functional impairment.     Diagnosis: "   Cognitive complaints. Normal exam today. Nonamnestic profile suggests that incipient neurodegenerative process is unlikely.     Treatment Plan/Recommendations:   - Patient was reassured of her intact and stable testing. Will continue to monitor longitudinally.   - Discussed possible sleep study given her history of snoring and consider evaluation.   - Brain-healthy lifestyle.   - She plans to follow up with her nephrologist for her elevated BP.   - Follow up in 9-12 months with repeat memory test.   - Contact sooner if needed.     Josemanuel Soriano MD.

## 2025-07-21 NOTE — PROGRESS NOTES
Pt reported that her BP's were, she takes Amlodipine 10 mg daily:  6/11 - 143/81  6/12 - 138/73  6/13 - 136/72  6/21 - 153/78  7/5 -169/86    Message sent to Dr. Soriano making him aware.

## 2025-07-21 NOTE — PATIENT INSTRUCTIONS
Plan:   - Your memory test is in the normal range and stable from last year. It does not show any concern at present for any progressive cognition impairment.   - Consider sleep study for possible sleep apnea, and treatment if indicated.   - Follow up in 9 to 12 months with repeat memory test.   - Contact sooner if needed.     Brain-healthy lifestyle:   - Make sure your medical conditions (if any) such as diabetes, high blood pressure, high cholesterol, thyroid disease sleep apnea are optimally controlled.   - Use eyeglasses or hearing aids appropriately if needed.   - Eat a heart healthy diet (like a Mediterranean diet; lots of fruits and vegetables, fish; low fat;)  - Exercise regularly as tolerated.   - Maintain good sleep hygiene; avoid daytime naps; try to get 7 to 8 hours of continuous sleep at night.   - Stay mentally active - puzzles, word searches, books, playing cards.  - Stay socially active and engaged.

## 2025-07-30 ENCOUNTER — APPOINTMENT (OUTPATIENT)
Dept: BEHAVIORAL HEALTH | Facility: CLINIC | Age: 75
End: 2025-07-30
Payer: MEDICARE

## 2025-07-30 ENCOUNTER — APPOINTMENT (OUTPATIENT)
Dept: GERIATRIC MEDICINE | Facility: CLINIC | Age: 75
End: 2025-07-30
Payer: MEDICARE

## 2025-08-14 DIAGNOSIS — I10 ESSENTIAL HYPERTENSION: Primary | ICD-10-CM

## 2025-08-18 RX ORDER — LISINOPRIL 10 MG/1
10 TABLET ORAL
Qty: 90 TABLET | Refills: 3 | Status: SHIPPED | OUTPATIENT
Start: 2025-08-18

## 2025-08-21 ENCOUNTER — HOSPITAL ENCOUNTER (OUTPATIENT)
Dept: RADIOLOGY | Facility: CLINIC | Age: 75
Discharge: HOME | End: 2025-08-21
Payer: MEDICARE

## 2025-08-21 VITALS — HEIGHT: 62 IN | BODY MASS INDEX: 26.29 KG/M2 | WEIGHT: 142.86 LBS

## 2025-08-21 DIAGNOSIS — Z12.31 ENCOUNTER FOR SCREENING MAMMOGRAM FOR MALIGNANT NEOPLASM OF BREAST: ICD-10-CM

## 2025-08-21 PROCEDURE — 77067 SCR MAMMO BI INCL CAD: CPT

## 2025-08-21 PROCEDURE — 77067 SCR MAMMO BI INCL CAD: CPT | Performed by: RADIOLOGY

## 2025-08-21 PROCEDURE — 77063 BREAST TOMOSYNTHESIS BI: CPT | Performed by: RADIOLOGY

## 2025-12-08 ENCOUNTER — APPOINTMENT (OUTPATIENT)
Dept: NEPHROLOGY | Facility: CLINIC | Age: 75
End: 2025-12-08
Payer: MEDICARE